# Patient Record
Sex: MALE | Race: WHITE | Employment: OTHER | URBAN - METROPOLITAN AREA
[De-identification: names, ages, dates, MRNs, and addresses within clinical notes are randomized per-mention and may not be internally consistent; named-entity substitution may affect disease eponyms.]

---

## 2018-11-06 ENCOUNTER — HOSPITAL ENCOUNTER (INPATIENT)
Age: 73
LOS: 2 days | Discharge: HOME | DRG: 309 | End: 2018-11-08
Attending: EMERGENCY MEDICINE | Admitting: INTERNAL MEDICINE
Payer: MEDICARE

## 2018-11-06 ENCOUNTER — APPOINTMENT (OUTPATIENT)
Dept: GENERAL RADIOLOGY | Age: 73
DRG: 309 | End: 2018-11-06
Attending: EMERGENCY MEDICINE
Payer: MEDICARE

## 2018-11-06 DIAGNOSIS — I48.91 ATRIAL FIBRILLATION WITH RAPID VENTRICULAR RESPONSE (HCC): Primary | ICD-10-CM

## 2018-11-06 DIAGNOSIS — E87.1 HYPONATREMIA: ICD-10-CM

## 2018-11-06 DIAGNOSIS — E87.6 HYPOKALEMIA: ICD-10-CM

## 2018-11-06 LAB
ALBUMIN SERPL-MCNC: 3.6 G/DL (ref 3.5–5)
ALBUMIN/GLOB SERPL: 0.8 {RATIO} (ref 1.1–2.2)
ALP SERPL-CCNC: 84 U/L (ref 45–117)
ALT SERPL-CCNC: 32 U/L (ref 12–78)
ANION GAP SERPL CALC-SCNC: 10 MMOL/L (ref 5–15)
ANION GAP SERPL CALC-SCNC: 7 MMOL/L (ref 5–15)
AST SERPL-CCNC: 17 U/L (ref 15–37)
ATRIAL RATE: 178 BPM
BASOPHILS # BLD: 0.1 K/UL (ref 0–0.1)
BASOPHILS NFR BLD: 0 % (ref 0–1)
BILIRUB SERPL-MCNC: 0.9 MG/DL (ref 0.2–1)
BUN SERPL-MCNC: 16 MG/DL (ref 6–20)
BUN SERPL-MCNC: 18 MG/DL (ref 6–20)
BUN/CREAT SERPL: 14 (ref 12–20)
BUN/CREAT SERPL: 14 (ref 12–20)
CALCIUM SERPL-MCNC: 8.4 MG/DL (ref 8.5–10.1)
CALCIUM SERPL-MCNC: 9.3 MG/DL (ref 8.5–10.1)
CALCULATED R AXIS, ECG10: 48 DEGREES
CALCULATED T AXIS, ECG11: 21 DEGREES
CHLORIDE SERPL-SCNC: 82 MMOL/L (ref 97–108)
CHLORIDE SERPL-SCNC: 91 MMOL/L (ref 97–108)
CK SERPL-CCNC: 72 U/L (ref 39–308)
CO2 SERPL-SCNC: 31 MMOL/L (ref 21–32)
CO2 SERPL-SCNC: 33 MMOL/L (ref 21–32)
COMMENT, HOLDF: NORMAL
CREAT SERPL-MCNC: 1.17 MG/DL (ref 0.7–1.3)
CREAT SERPL-MCNC: 1.31 MG/DL (ref 0.7–1.3)
DIAGNOSIS, 93000: NORMAL
DIFFERENTIAL METHOD BLD: ABNORMAL
EOSINOPHIL # BLD: 0.2 K/UL (ref 0–0.4)
EOSINOPHIL NFR BLD: 2 % (ref 0–7)
ERYTHROCYTE [DISTWIDTH] IN BLOOD BY AUTOMATED COUNT: 12.9 % (ref 11.5–14.5)
GLOBULIN SER CALC-MCNC: 4.4 G/DL (ref 2–4)
GLUCOSE BLD STRIP.AUTO-MCNC: 172 MG/DL (ref 65–100)
GLUCOSE BLD STRIP.AUTO-MCNC: 260 MG/DL (ref 65–100)
GLUCOSE SERPL-MCNC: 145 MG/DL (ref 65–100)
GLUCOSE SERPL-MCNC: 208 MG/DL (ref 65–100)
HCT VFR BLD AUTO: 44.9 % (ref 36.6–50.3)
HGB BLD-MCNC: 15.9 G/DL (ref 12.1–17)
IMM GRANULOCYTES # BLD: 0 K/UL (ref 0–0.04)
IMM GRANULOCYTES NFR BLD AUTO: 0 % (ref 0–0.5)
LYMPHOCYTES # BLD: 1.2 K/UL (ref 0.8–3.5)
LYMPHOCYTES NFR BLD: 11 % (ref 12–49)
MAGNESIUM SERPL-MCNC: 2 MG/DL (ref 1.6–2.4)
MCH RBC QN AUTO: 27.9 PG (ref 26–34)
MCHC RBC AUTO-ENTMCNC: 35.4 G/DL (ref 30–36.5)
MCV RBC AUTO: 78.8 FL (ref 80–99)
MONOCYTES # BLD: 1.2 K/UL (ref 0–1)
MONOCYTES NFR BLD: 11 % (ref 5–13)
NEUTS SEG # BLD: 8.5 K/UL (ref 1.8–8)
NEUTS SEG NFR BLD: 76 % (ref 32–75)
NRBC # BLD: 0 K/UL (ref 0–0.01)
NRBC BLD-RTO: 0 PER 100 WBC
PHOSPHATE SERPL-MCNC: 3 MG/DL (ref 2.6–4.7)
PLATELET # BLD AUTO: 416 K/UL (ref 150–400)
PMV BLD AUTO: 9.4 FL (ref 8.9–12.9)
POTASSIUM SERPL-SCNC: 2.8 MMOL/L (ref 3.5–5.1)
POTASSIUM SERPL-SCNC: 3 MMOL/L (ref 3.5–5.1)
PROT SERPL-MCNC: 8 G/DL (ref 6.4–8.2)
Q-T INTERVAL, ECG07: 296 MS
QRS DURATION, ECG06: 90 MS
QTC CALCULATION (BEZET), ECG08: 478 MS
RBC # BLD AUTO: 5.7 M/UL (ref 4.1–5.7)
SAMPLES BEING HELD,HOLD: NORMAL
SERVICE CMNT-IMP: ABNORMAL
SERVICE CMNT-IMP: ABNORMAL
SODIUM SERPL-SCNC: 125 MMOL/L (ref 136–145)
SODIUM SERPL-SCNC: 129 MMOL/L (ref 136–145)
TROPONIN I SERPL-MCNC: <0.05 NG/ML
VENTRICULAR RATE, ECG03: 157 BPM
WBC # BLD AUTO: 11.2 K/UL (ref 4.1–11.1)

## 2018-11-06 PROCEDURE — 85025 COMPLETE CBC W/AUTO DIFF WBC: CPT | Performed by: EMERGENCY MEDICINE

## 2018-11-06 PROCEDURE — 74011250636 HC RX REV CODE- 250/636: Performed by: INTERNAL MEDICINE

## 2018-11-06 PROCEDURE — 74011000258 HC RX REV CODE- 258: Performed by: EMERGENCY MEDICINE

## 2018-11-06 PROCEDURE — 83735 ASSAY OF MAGNESIUM: CPT | Performed by: INTERNAL MEDICINE

## 2018-11-06 PROCEDURE — 74011250637 HC RX REV CODE- 250/637: Performed by: INTERNAL MEDICINE

## 2018-11-06 PROCEDURE — 36415 COLL VENOUS BLD VENIPUNCTURE: CPT | Performed by: INTERNAL MEDICINE

## 2018-11-06 PROCEDURE — 71045 X-RAY EXAM CHEST 1 VIEW: CPT

## 2018-11-06 PROCEDURE — 93005 ELECTROCARDIOGRAM TRACING: CPT

## 2018-11-06 PROCEDURE — 74011636637 HC RX REV CODE- 636/637: Performed by: INTERNAL MEDICINE

## 2018-11-06 PROCEDURE — 80053 COMPREHEN METABOLIC PANEL: CPT | Performed by: EMERGENCY MEDICINE

## 2018-11-06 PROCEDURE — 74011000250 HC RX REV CODE- 250: Performed by: EMERGENCY MEDICINE

## 2018-11-06 PROCEDURE — 99285 EMERGENCY DEPT VISIT HI MDM: CPT

## 2018-11-06 PROCEDURE — 82962 GLUCOSE BLOOD TEST: CPT

## 2018-11-06 PROCEDURE — 96374 THER/PROPH/DIAG INJ IV PUSH: CPT

## 2018-11-06 PROCEDURE — 82550 ASSAY OF CK (CPK): CPT | Performed by: EMERGENCY MEDICINE

## 2018-11-06 PROCEDURE — 74011250636 HC RX REV CODE- 250/636: Performed by: EMERGENCY MEDICINE

## 2018-11-06 PROCEDURE — 84100 ASSAY OF PHOSPHORUS: CPT | Performed by: INTERNAL MEDICINE

## 2018-11-06 PROCEDURE — 96361 HYDRATE IV INFUSION ADD-ON: CPT

## 2018-11-06 PROCEDURE — 94761 N-INVAS EAR/PLS OXIMETRY MLT: CPT

## 2018-11-06 PROCEDURE — 84484 ASSAY OF TROPONIN QUANT: CPT | Performed by: EMERGENCY MEDICINE

## 2018-11-06 PROCEDURE — 74011250637 HC RX REV CODE- 250/637: Performed by: EMERGENCY MEDICINE

## 2018-11-06 PROCEDURE — 65660000000 HC RM CCU STEPDOWN

## 2018-11-06 PROCEDURE — 96376 TX/PRO/DX INJ SAME DRUG ADON: CPT

## 2018-11-06 PROCEDURE — 74011000250 HC RX REV CODE- 250

## 2018-11-06 RX ORDER — INDAPAMIDE 2.5 MG/1
2.5 TABLET, FILM COATED ORAL DAILY
COMMUNITY
End: 2018-11-08

## 2018-11-06 RX ORDER — MAGNESIUM SULFATE 100 %
4 CRYSTALS MISCELLANEOUS AS NEEDED
Status: DISCONTINUED | OUTPATIENT
Start: 2018-11-06 | End: 2018-11-08 | Stop reason: HOSPADM

## 2018-11-06 RX ORDER — NORTRIPTYLINE HYDROCHLORIDE 10 MG/1
20 CAPSULE ORAL
Status: DISCONTINUED | OUTPATIENT
Start: 2018-11-06 | End: 2018-11-08 | Stop reason: HOSPADM

## 2018-11-06 RX ORDER — DEXTROSE 50 % IN WATER (D50W) INTRAVENOUS SYRINGE
12.5-25 AS NEEDED
Status: DISCONTINUED | OUTPATIENT
Start: 2018-11-06 | End: 2018-11-08 | Stop reason: HOSPADM

## 2018-11-06 RX ORDER — POTASSIUM CHLORIDE 20 MEQ/1
20 TABLET, EXTENDED RELEASE ORAL ONCE
Status: COMPLETED | OUTPATIENT
Start: 2018-11-06 | End: 2018-11-06

## 2018-11-06 RX ORDER — DILTIAZEM HYDROCHLORIDE 5 MG/ML
0.25 INJECTION INTRAVENOUS ONCE
Status: COMPLETED | OUTPATIENT
Start: 2018-11-06 | End: 2018-11-06

## 2018-11-06 RX ORDER — IBUPROFEN 800 MG/1
800 TABLET ORAL
COMMUNITY

## 2018-11-06 RX ORDER — MELATONIN
1000 DAILY
COMMUNITY

## 2018-11-06 RX ORDER — GUAIFENESIN 100 MG/5ML
81 LIQUID (ML) ORAL DAILY
Status: DISCONTINUED | OUTPATIENT
Start: 2018-11-07 | End: 2018-11-08 | Stop reason: HOSPADM

## 2018-11-06 RX ORDER — ADENOSINE 3 MG/ML
6 INJECTION, SOLUTION INTRAVENOUS
Status: COMPLETED | OUTPATIENT
Start: 2018-11-06 | End: 2018-11-06

## 2018-11-06 RX ORDER — INSULIN LISPRO 100 [IU]/ML
INJECTION, SOLUTION INTRAVENOUS; SUBCUTANEOUS
Status: DISCONTINUED | OUTPATIENT
Start: 2018-11-06 | End: 2018-11-06 | Stop reason: CLARIF

## 2018-11-06 RX ORDER — BISACODYL 5 MG
5 TABLET, DELAYED RELEASE (ENTERIC COATED) ORAL DAILY PRN
Status: DISCONTINUED | OUTPATIENT
Start: 2018-11-06 | End: 2018-11-08 | Stop reason: HOSPADM

## 2018-11-06 RX ORDER — DILTIAZEM HYDROCHLORIDE 5 MG/ML
INJECTION INTRAVENOUS
Status: COMPLETED
Start: 2018-11-06 | End: 2018-11-06

## 2018-11-06 RX ORDER — MAGNESIUM SULFATE 100 %
4 CRYSTALS MISCELLANEOUS AS NEEDED
Status: DISCONTINUED | OUTPATIENT
Start: 2018-11-06 | End: 2018-11-06 | Stop reason: CLARIF

## 2018-11-06 RX ORDER — ENOXAPARIN SODIUM 100 MG/ML
1 INJECTION SUBCUTANEOUS EVERY 12 HOURS
Status: DISCONTINUED | OUTPATIENT
Start: 2018-11-06 | End: 2018-11-06

## 2018-11-06 RX ORDER — MELATONIN
1000 DAILY
Status: DISCONTINUED | OUTPATIENT
Start: 2018-11-07 | End: 2018-11-08 | Stop reason: HOSPADM

## 2018-11-06 RX ORDER — POTASSIUM CHLORIDE 20 MEQ/1
40 TABLET, EXTENDED RELEASE ORAL
Status: COMPLETED | OUTPATIENT
Start: 2018-11-06 | End: 2018-11-06

## 2018-11-06 RX ORDER — SODIUM CHLORIDE AND POTASSIUM CHLORIDE .9; .15 G/100ML; G/100ML
SOLUTION INTRAVENOUS CONTINUOUS
Status: DISCONTINUED | OUTPATIENT
Start: 2018-11-06 | End: 2018-11-08 | Stop reason: HOSPADM

## 2018-11-06 RX ORDER — INSULIN LISPRO 100 [IU]/ML
INJECTION, SOLUTION INTRAVENOUS; SUBCUTANEOUS
Status: DISCONTINUED | OUTPATIENT
Start: 2018-11-06 | End: 2018-11-08 | Stop reason: HOSPADM

## 2018-11-06 RX ORDER — ADENOSINE 3 MG/ML
12 INJECTION, SOLUTION INTRAVENOUS
Status: COMPLETED | OUTPATIENT
Start: 2018-11-06 | End: 2018-11-06

## 2018-11-06 RX ORDER — SODIUM CHLORIDE 0.9 % (FLUSH) 0.9 %
5-10 SYRINGE (ML) INJECTION AS NEEDED
Status: DISCONTINUED | OUTPATIENT
Start: 2018-11-06 | End: 2018-11-08 | Stop reason: HOSPADM

## 2018-11-06 RX ORDER — ONDANSETRON 2 MG/ML
4 INJECTION INTRAMUSCULAR; INTRAVENOUS
Status: DISCONTINUED | OUTPATIENT
Start: 2018-11-06 | End: 2018-11-08 | Stop reason: HOSPADM

## 2018-11-06 RX ORDER — SODIUM CHLORIDE 0.9 % (FLUSH) 0.9 %
5-10 SYRINGE (ML) INJECTION EVERY 8 HOURS
Status: DISCONTINUED | OUTPATIENT
Start: 2018-11-06 | End: 2018-11-08 | Stop reason: HOSPADM

## 2018-11-06 RX ORDER — ACETAMINOPHEN 325 MG/1
650 TABLET ORAL
Status: DISCONTINUED | OUTPATIENT
Start: 2018-11-06 | End: 2018-11-08 | Stop reason: HOSPADM

## 2018-11-06 RX ORDER — ATORVASTATIN CALCIUM 10 MG/1
10 TABLET, FILM COATED ORAL DAILY
Status: DISCONTINUED | OUTPATIENT
Start: 2018-11-07 | End: 2018-11-08 | Stop reason: HOSPADM

## 2018-11-06 RX ORDER — DEXTROSE 50 % IN WATER (D50W) INTRAVENOUS SYRINGE
25-50 AS NEEDED
Status: DISCONTINUED | OUTPATIENT
Start: 2018-11-06 | End: 2018-11-06 | Stop reason: CLARIF

## 2018-11-06 RX ORDER — POTASSIUM CHLORIDE 750 MG/1
60 TABLET, FILM COATED, EXTENDED RELEASE ORAL
Status: DISCONTINUED | OUTPATIENT
Start: 2018-11-06 | End: 2018-11-06 | Stop reason: CLARIF

## 2018-11-06 RX ADMIN — INSULIN LISPRO 5 UNITS: 100 INJECTION, SOLUTION INTRAVENOUS; SUBCUTANEOUS at 21:02

## 2018-11-06 RX ADMIN — POTASSIUM CHLORIDE 20 MEQ: 20 TABLET, EXTENDED RELEASE ORAL at 19:37

## 2018-11-06 RX ADMIN — ENOXAPARIN SODIUM 90 MG: 100 INJECTION, SOLUTION INTRAVENOUS; SUBCUTANEOUS at 19:35

## 2018-11-06 RX ADMIN — ADENOSINE 6 MG: 3 INJECTION, SOLUTION INTRAVENOUS at 13:51

## 2018-11-06 RX ADMIN — Medication 10 ML: at 18:05

## 2018-11-06 RX ADMIN — ADENOSINE 12 MG: 3 INJECTION, SOLUTION INTRAVENOUS at 13:52

## 2018-11-06 RX ADMIN — SODIUM CHLORIDE 1000 ML: 900 INJECTION, SOLUTION INTRAVENOUS at 13:50

## 2018-11-06 RX ADMIN — DILTIAZEM HYDROCHLORIDE 23.5 MG: 5 INJECTION INTRAVENOUS at 14:25

## 2018-11-06 RX ADMIN — POTASSIUM CHLORIDE 40 MEQ: 20 TABLET, EXTENDED RELEASE ORAL at 15:34

## 2018-11-06 RX ADMIN — DILTIAZEM HYDROCHLORIDE 5 MG/HR: 5 INJECTION, SOLUTION INTRAVENOUS at 15:05

## 2018-11-06 RX ADMIN — SODIUM CHLORIDE AND POTASSIUM CHLORIDE: 9; 1.49 INJECTION, SOLUTION INTRAVENOUS at 19:37

## 2018-11-06 RX ADMIN — NORTRIPTYLINE HYDROCHLORIDE 20 MG: 10 CAPSULE ORAL at 21:04

## 2018-11-06 RX ADMIN — Medication 10 ML: at 21:02

## 2018-11-06 RX ADMIN — DILTIAZEM HYDROCHLORIDE 7.5 MG/HR: 5 INJECTION, SOLUTION INTRAVENOUS at 23:11

## 2018-11-06 NOTE — ED PROVIDER NOTES
EMERGENCY DEPARTMENT HISTORY AND PHYSICAL EXAM 
 
 
Date: 11/6/2018 Patient Name: Booker West History of Presenting Illness Chief Complaint Patient presents with  Chest Pain Chest tightness since last night with associated SOB. Patient in A. FIb without further hx.  Irregular Heart Beat History Provided By: Patient HPI: Booker West, 72 y.o. male with PMHx significant for DM, HTN, asthma, presents via EMS to the ED with cc of intermittent, moderate SOB and chest tightness x 3 days. Pt states his sx's are exacerbated by exertion and improved with rest. He was seen and evaluated by PCP this morning where his heart rate was found to be fast, prompting his ED visit. Upon EMS arrival pt found to be in afib with RVR. He denies hx of similar sx's. He specifically denies any fevers, chills, nausea, vomiting, headache, rash, diarrhea, sweating or weight loss. There are no other complaints, changes, or physical findings at this time. PCP: Maricel Juan MD 
 
Current Facility-Administered Medications Medication Dose Route Frequency Provider Last Rate Last Dose  dilTIAZem (CARDIZEM) 125 mg in dextrose 5% 125 mL infusion  5-15 mg/hr IntraVENous TITRATE Ronald Moyer MD 5 mL/hr at 11/06/18 1505 5 mg/hr at 11/06/18 1505  potassium chloride (K-DUR, KLOR-CON) SR tablet 40 mEq  40 mEq Oral NOW Ronald Moyer MD      
 potassium chloride (K-DUR, KLOR-CON) SR tablet 20 mEq  20 mEq Oral ONCE Ronald Moyer MD      
 
Current Outpatient Medications Medication Sig Dispense Refill  indapamide (LOZOL) 2.5 mg tablet Take 2.5 mg by mouth daily.  ibuprofen (MOTRIN) 800 mg tablet Take 800 mg by mouth every eight (8) hours as needed for Pain.  cholecalciferol (VITAMIN D3) 1,000 unit tablet Take 1,000 Units by mouth daily.  vit C/E/Zn/coppr/lutein/zeaxan (PRESERVISION AREDS-2 PO) Take 1 Tab by mouth daily.  nortriptyline (PAMELOR) 10 mg capsule Take 20 mg by mouth nightly.  rosuvastatin (CRESTOR) 5 mg tablet Take 5 mg by mouth daily.  telmisartan (MICARDIS) 40 mg tablet Take 40 mg by mouth nightly.  aspirin 81 mg chewable tablet Take 81 mg by mouth daily. Past History Past Medical History: 
Past Medical History:  
Diagnosis Date  Asthma   
 as a child  Diabetes ([address] diet controlled  Hypertension Past Surgical History: 
Past Surgical History:  
Procedure Laterality Date  HX GI    
 polypectomy  HX HEENT    
 sinus surgery  HX ORTHOPAEDIC  2009  
 lt shoulder replacement Family History: 
History reviewed. No pertinent family history. Social History: 
Social History Tobacco Use  Smoking status: Never Smoker Substance Use Topics  Alcohol use: No  
 Drug use: Not on file Allergies: 
No Known Allergies Review of Systems Review of Systems Constitutional: Negative. Negative for activity change, appetite change, chills, fatigue, fever and unexpected weight change. HENT: Negative. Negative for congestion, hearing loss, rhinorrhea, sneezing and voice change. Eyes: Negative. Negative for pain and visual disturbance. Respiratory: Positive for chest tightness and shortness of breath. Negative for apnea, cough and choking. Cardiovascular: Negative. Negative for palpitations. Gastrointestinal: Negative. Negative for abdominal distention, abdominal pain, blood in stool, diarrhea, nausea and vomiting. Genitourinary: Negative. Negative for difficulty urinating, flank pain, frequency and urgency. No discharge Musculoskeletal: Negative. Negative for arthralgias, back pain, myalgias and neck stiffness. Skin: Negative. Negative for color change and rash. Neurological: Negative. Negative for dizziness, seizures, syncope, speech difficulty, weakness, numbness and headaches. Hematological: Negative for adenopathy. Psychiatric/Behavioral: Negative. Negative for agitation, behavioral problems, dysphoric mood and suicidal ideas. The patient is not nervous/anxious. Physical Exam  
Physical Exam  
Constitutional: He is oriented to person, place, and time. He appears well-developed and well-nourished. No distress. HENT:  
Head: Normocephalic and atraumatic. Mouth/Throat: Oropharynx is clear and moist. No oropharyngeal exudate. Eyes: Conjunctivae and EOM are normal. Pupils are equal, round, and reactive to light. Right eye exhibits no discharge. Left eye exhibits no discharge. Neck: Normal range of motion. Neck supple. Cardiovascular: Intact distal pulses. An irregularly irregular rhythm present. Tachycardia present. Exam reveals no gallop and no friction rub. No murmur heard. Pulmonary/Chest: Effort normal and breath sounds normal. No respiratory distress. He has no wheezes. He has no rales. He exhibits no tenderness. Abdominal: Soft. Bowel sounds are normal. He exhibits no distension and no mass. There is no tenderness. There is no rebound and no guarding. Musculoskeletal: Normal range of motion. He exhibits no edema. Lymphadenopathy:  
  He has no cervical adenopathy. Neurological: He is alert and oriented to person, place, and time. No cranial nerve deficit. Coordination normal.  
Skin: Skin is warm and dry. No rash noted. No erythema. Psychiatric: He has a normal mood and affect. Nursing note and vitals reviewed. Diagnostic Study Results Labs - Recent Results (from the past 12 hour(s)) EKG, 12 LEAD, INITIAL Collection Time: 11/06/18  1:32 PM  
Result Value Ref Range Ventricular Rate 157 BPM  
 Atrial Rate 178 BPM  
 QRS Duration 90 ms Q-T Interval 296 ms QTC Calculation (Bezet) 478 ms Calculated R Axis 48 degrees Calculated T Axis 21 degrees Diagnosis Atrial fibrillation with rapid ventricular response Nonspecific ST abnormality When compared with ECG of 07-APR-2009 08:45, Atrial fibrillation has replaced Sinus rhythm Vent. rate has increased BY  98 BPM 
ST now depressed in Inferior leads ST now depressed in Anterolateral leads Confirmed by Marquise Aponte ([id number]) on 11/6/2018 3:10:58 PM 
  
CBC WITH AUTOMATED DIFF Collection Time: 11/06/18  1:38 PM  
Result Value Ref Range WBC 11.2 (H) 4.1 - 11.1 K/uL  
 RBC 5.70 4. 10 - 5.70 M/uL  
 HGB 15.9 12.1 - 17.0 g/dL HCT 44.9 36.6 - 50.3 % MCV 78.8 (L) 80.0 - 99.0 FL  
 MCH 27.9 26.0 - 34.0 PG  
 MCHC 35.4 30.0 - 36.5 g/dL  
 RDW 12.9 11.5 - 14.5 % PLATELET 910 (H) 366 - 400 K/uL MPV 9.4 8.9 - 12.9 FL  
 NRBC 0.0 0  WBC ABSOLUTE NRBC 0.00 0.00 - 0.01 K/uL NEUTROPHILS 76 (H) 32 - 75 % LYMPHOCYTES 11 (L) 12 - 49 % MONOCYTES 11 5 - 13 % EOSINOPHILS 2 0 - 7 % BASOPHILS 0 0 - 1 % IMMATURE GRANULOCYTES 0 0.0 - 0.5 % ABS. NEUTROPHILS 8.5 (H) 1.8 - 8.0 K/UL  
 ABS. LYMPHOCYTES 1.2 0.8 - 3.5 K/UL  
 ABS. MONOCYTES 1.2 (H) 0.0 - 1.0 K/UL  
 ABS. EOSINOPHILS 0.2 0.0 - 0.4 K/UL  
 ABS. BASOPHILS 0.1 0.0 - 0.1 K/UL  
 ABS. IMM. GRANS. 0.0 0.00 - 0.04 K/UL  
 DF AUTOMATED METABOLIC PANEL, COMPREHENSIVE Collection Time: 11/06/18  1:38 PM  
Result Value Ref Range Sodium 125 (L) 136 - 145 mmol/L Potassium 2.8 (L) 3.5 - 5.1 mmol/L Chloride 82 (L) 97 - 108 mmol/L  
 CO2 33 (H) 21 - 32 mmol/L Anion gap 10 5 - 15 mmol/L Glucose 208 (H) 65 - 100 mg/dL BUN 18 6 - 20 MG/DL Creatinine 1.31 (H) 0.70 - 1.30 MG/DL  
 BUN/Creatinine ratio 14 12 - 20 GFR est AA >60 >60 ml/min/1.73m2 GFR est non-AA 54 (L) >60 ml/min/1.73m2 Calcium 9.3 8.5 - 10.1 MG/DL Bilirubin, total 0.9 0.2 - 1.0 MG/DL  
 ALT (SGPT) 32 12 - 78 U/L  
 AST (SGOT) 17 15 - 37 U/L Alk. phosphatase 84 45 - 117 U/L Protein, total 8.0 6.4 - 8.2 g/dL Albumin 3.6 3.5 - 5.0 g/dL Globulin 4.4 (H) 2.0 - 4.0 g/dL A-G Ratio 0.8 (L) 1.1 - 2.2 CK W/ REFLX CKMB Collection Time: 11/06/18  1:38 PM  
Result Value Ref Range CK 72 39 - 308 U/L  
TROPONIN I Collection Time: 11/06/18  1:38 PM  
Result Value Ref Range Troponin-I, Qt. <0.05 <0.05 ng/mL SAMPLES BEING HELD Collection Time: 11/06/18  1:38 PM  
Result Value Ref Range SAMPLES BEING HELD 1BLUE   
 COMMENT Add-on orders for these samples will be processed based on acceptable specimen integrity and analyte stability, which may vary by analyte. Radiologic Studies - CXR Results  (Last 48 hours) 11/06/18 1450  XR CHEST PORT Final result Impression:  IMPRESSION: No acute findings. Narrative:  EXAM:  XR CHEST PORT INDICATION:  chest pain COMPARISON:  1/28/2014 FINDINGS: A portable AP radiograph of the chest was obtained at 1432 hours. There is no pneumothorax or pleural effusion. The lungs are clear. Cardiac,  
mediastinal and hilar contours appear within normal limits. Left glenohumeral  
total arthroplasty is again shown. Medical Decision Making I am the first provider for this patient. I reviewed the vital signs, available nursing notes, past medical history, past surgical history, family history and social history. Vital Signs-Reviewed the patient's vital signs. Patient Vitals for the past 12 hrs: 
 Temp Pulse Resp BP SpO2  
11/06/18 1500  (!) 111 24 127/78 92 % 11/06/18 1428  (!) 103 22 135/65 96 % 11/06/18 1403  (!) 148 24 (!) 154/102 98 % 11/06/18 1352  (!) 149  (!) 139/113   
11/06/18 1351  (!) 151  (!) 139/113   
11/06/18 1330 98.1 °F (36.7 °C) (!) 154 22 (!) 139/113 95 % Pulse Oximetry Analysis - 95% on RA Cardiac Monitor:  
Rate: 154 bpm 
Rhythm: Atrial Fibrillation with RVR EKG interpretation: (Preliminary) [address] Rhythm: afib with RVR; and irregular.  Rate (approx.): 157; Axis: normal; QRS interval: normal ; ST/T wave: normal. 
Written by Mabel Davila ED Scribe, as dictated by Asa Muse. Jensen Rueda MD 
 
Records Reviewed: Nursing Notes, Old Medical Records, Previous electrocardiograms, Ambulance Run Sheet, Previous Radiology Studies and Previous Laboratory Studies Provider Notes (Medical Decision Making): DDx: ACS, arrhythmia, electrolyte abnormality, dehydration ED Course:  
Initial assessment performed. The patients presenting problems have been discussed, and they are in agreement with the care plan formulated and outlined with them. I have encouraged them to ask questions as they arise throughout their visit. Procedure Note - Chemical Cardioversion:  
1:52 PM 
Performed by Asa Muse. Jensen Rueda MD . Cardioversion was indicated for a rhythm of atrial fibrillation with RVR and performed 2 times. 6 mg of adenosine were given to pt, but pt quickly returned to afib. Pt was then given an additional 12mg of Adenosine. Patient's rhythm was atrial fibrillation with RVR at the end of the procedure. Will hang diltiazem bolus and admit to hospitalist. 
The procedure took 1-15 minutes, and pt tolerated well. CONSULT NOTE:  
3:18 PM 
Huber Rueda MD spoke with Harriet Galindo MD, Specialty: Hospitalist 
Discussed pt's hx, disposition, and available diagnostic and imaging results. Reviewed care plans. Consultant will evaluate pt for admission. Written by Frankie Thompson ED Scribe, as dictated by Asa Muse. Jensen Rueda MD. 
 
CRITICAL CARE NOTE : 
 
1:58 PM 
 
IMPENDING DETERIORATION -Cardiovascular ASSOCIATED RISK FACTORS - Metabolic changes MANAGEMENT- Bedside Assessment and Supervision of Care INTERPRETATION -  ECG, Blood Pressure and Cardiac Output Measures INTERVENTIONS - hemodynamic mngmt CASE REVIEW - Hospitalist 
TREATMENT RESPONSE -Improved and Stable PERFORMED BY - Self NOTES   : 
 
I have spent 45 minutes of critical care time involved in lab review, consultations with specialist, family decision- making, bedside attention and documentation. During this entire length of time I was immediately available to the patient . Huber Barry MD 
 
Disposition: 
ADMIT NOTE: 
3:20 PM 
The patient is being admitted to the hospital by Arturo Parmar MD.  The results of their tests and reasons for their admission have been discussed with the patient and/or available family. They convey agreement and understanding for the need to be admitted and for their admission diagnosis. PLAN: 
1. Admit to hosptialist 
 
Diagnosis Clinical Impression: 1. Atrial fibrillation with rapid ventricular response ([address] 2. Hypokalemia 3. Hyponatremia Attestations: This note is prepared by Amadna Foley, acting as Scribe for Tamika Werner. [address] Carmen Barry MD: The scribe's documentation has been prepared under my direction and personally reviewed by me in its entirety. I confirm that the note above accurately reflects all work, treatment, procedures, and medical decision making performed by me.

## 2018-11-06 NOTE — ED NOTES
TRANSFER - OUT REPORT: 
 
Verbal report given to IRMA Lowe(name) on Jordan Freed  being transferred to PCU (unit) for routine progression of care Report consisted of patients Situation, Background, Assessment and  
Recommendations(SBAR). Information from the following report(s) SBAR, Kardex, ED Summary, Intake/Output, MAR, Recent Results, Med Rec Status and Cardiac Rhythm A. Fib was reviewed with the receiving nurse. Lines:  
Peripheral IV 11/06/18 Right Antecubital (Active) Site Assessment Clean, dry, & intact 11/6/2018  1:42 PM  
Phlebitis Assessment 0 11/6/2018  1:42 PM  
Infiltration Assessment 0 11/6/2018  1:42 PM  
Dressing Status Clean, dry, & intact 11/6/2018  1:42 PM  
Dressing Type Transparent;Tape 11/6/2018  1:42 PM  
Hub Color/Line Status Pink;Flushed 11/6/2018  1:42 PM  
Action Taken Blood drawn 11/6/2018  1:42 PM  
   
Peripheral IV 11/06/18 Left Antecubital (Active) Site Assessment Clean, dry, & intact 11/6/2018  2:25 PM  
Phlebitis Assessment 0 11/6/2018  2:25 PM  
Infiltration Assessment 0 11/6/2018  2:25 PM  
Dressing Status Clean, dry, & intact 11/6/2018  2:25 PM  
Dressing Type Transparent;Tape 11/6/2018  2:25 PM  
Hub Color/Line Status Pink;Flushed 11/6/2018  2:25 PM  
Action Taken Blood drawn 11/6/2018  2:25 PM  
  
 
Opportunity for questions and clarification was provided. Patient transported with: 
 Monitor Registered Nurse

## 2018-11-06 NOTE — PROGRESS NOTES
Pharmacy Clarification of Prior to Admission Medication Regimen The patient was interviewed regarding clarification of the prior to admission medication regimen. Patient's wife was present in room and obtained permission from patient to discuss drug regimen with visitor(s) present. Patient was questioned regarding use of any other inhalers, topical products, over the counter medications, herbal medications, vitamin products or ophthalmic/nasal/otic medication use. Information Obtained From: Patient, Lizzie Lopez Pertinent Pharmacy Findings: 
? Updated patients [facility] [address] [place] [address] [address] AT [facility]  
 
[facility] medication list was corrected to the following:  
 
Prior to Admission Medications Prescriptions Last Dose Informant Patient Reported? Taking?  
aspirin 81 mg chewable tablet 11/1/2018 at Unknown time Self Yes Yes Sig: Take 81 mg by mouth daily. cholecalciferol (VITAMIN D3) 1,000 unit tablet 11/6/2018 at Unknown time Self Yes Yes Sig: Take 1,000 Units by mouth daily. ibuprofen (MOTRIN) 800 mg tablet 11/6/2018 at Unknown time Self Yes Yes Sig: Take 800 mg by mouth every eight (8) hours as needed for Pain. indapamide (LOZOL) 2.5 mg tablet 11/6/2018 at Unknown time Self Yes Yes Sig: Take 2.5 mg by mouth daily. nortriptyline (PAMELOR) 10 mg capsule 11/5/2018 at Unknown time Self Yes Yes Sig: Take 20 mg by mouth nightly. rosuvastatin (CRESTOR) 5 mg tablet 11/6/2018 at Unknown time Self Yes Yes Sig: Take 5 mg by mouth daily. telmisartan (MICARDIS) 40 mg tablet 11/5/2018 at Unknown time Self Yes Yes Sig: Take 40 mg by mouth nightly. vit C/E/Zn/coppr/lutein/zeaxan (PRESERVISION AREDS-2 PO) 11/6/2018 at Unknown time Self Yes Yes Sig: Take 1 Tab by mouth daily. Facility-Administered Medications: None Thank you, 
Bonita Amanda PharmD candidate Class of 2019

## 2018-11-06 NOTE — H&P
Hospitalist Admission NoteNAME: Britney Banks :  1945 MRN:  [id number] Date/Time:  2018 3:21 PM 
 
Patient PCP: Dulce Alcantara MD 
______________________________________________________________________ Given the patient's current clinical presentation, I have a high level of concern for decompensation if discharged from the emergency department. Complex decision making was performed, which includes reviewing the patient's available past medical records, laboratory results, and x-ray films. My assessment of this patient's clinical condition and my plan of care is as follows. Assessment / Plan: 
New-onset atrial fibrillation with RVR in setting of severe hypokalemia presumed due to recent gastroenteritis: denies palpitations - CXR with no acute findings 
- aggressively replacing potassium - check K, mag, phos later this evening 
- diltiazem gtt 
- emperic therapeutic lovenox for now - check TSH in AM 
- check echo - has never had stress or echo before, has never seen cardiology 
- cardiology consult in AM 
AODM with hyperglycemia: admits to being less compliant with diet recently - check HgA1c in AM 
- lispro sliding scale Hypertension, benign/essential: controlled 
- holding lozol and telmistartan to make room for diltiazem gtt 
- diltiazem as above Hyperlipidemia: con't statin JEISON: says he lost weight and told he no longer needs CPAP but wife endorses snoring 
- consider referral for repeat outpatient sleep testing Code Status: Full Surrogate Decision Maker: wife Jahaira Gallegos cell [phone] - wife's mother with dementia and colostomy lives with Pt so she is unable to come to the hospital very often; Please call his wife with updates DVT Prophylaxis: lovenox Baseline: independent Subjective: CHIEF COMPLAINT:  Sent from PCP for tachycardia HISTORY OF PRESENT ILLNESS:    
Danya Michelle is a 72 y.o.  male who presents with above.   Pt says he has been feeling poorly since last week. He complains of a \"stomach flu\" in which he had severe abdominal pain for a few days. He denies diarrhea with this. He did have loss of appetite and was not eating well, but was drinking soup and water. He recovered from this and was feeling better until a couple of days ago. Then, he developed chest pain throughout his precordium radiating into his neck. No radiation to his arms. He has PIZANO which is unusual for him. He denies nausea or stomach pain currently. He does endorse lightheadedness and dizziness. No new edema or focal weakness. He finally became concerned enough to see his PCP today but was found to have tachycardia and sent to the ER. We were asked to admit for work up and evaluation of the above problems. Past Medical History:  
Diagnosis Date  Asthma   
 as a child  Diabetes ([address] diet controlled  Hyperlipidemia  Hypertension  JEISON (obstructive sleep apnea)   
 lost weight, no longer on CPAP Past Surgical History:  
Procedure Laterality Date  HX GI    
 polypectomy  HX HEENT    
 sinus surgery  HX ORTHOPAEDIC  2009  
 lt shoulder replacement Social History Tobacco Use  Smoking status: Never Smoker Substance Use Topics  Alcohol use: No  
  
 
Family History Problem Relation Age of Onset  Coronary Artery Disease Mother  Coronary Artery Disease Father  Coronary Artery Disease Sister  Diabetes Sister 2 sisters \"prediabetes\" No Known Allergies Prior to Admission medications Medication Sig Start Date End Date Taking? Authorizing Provider  
indapamide (LOZOL) 2.5 mg tablet Take 2.5 mg by mouth daily. Yes Jose, MD Luigi  
ibuprofen (MOTRIN) 800 mg tablet Take 800 mg by mouth every eight (8) hours as needed for Pain. Yes Jose, MD Luigi  
cholecalciferol (VITAMIN D3) 1,000 unit tablet Take 1,000 Units by mouth daily.    Yes Luigi Garcia MD  
 vit C/E/Zn/coppr/lutein/zeaxan (PRESERVISION AREDS-2 PO) Take 1 Tab by mouth daily. Yes Other, MD Luigi  
nortriptyline (PAMELOR) 10 mg capsule Take 20 mg by mouth nightly. Yes Provider, Historical  
rosuvastatin (CRESTOR) 5 mg tablet Take 5 mg by mouth daily. Yes Provider, Historical  
telmisartan (MICARDIS) 40 mg tablet Take 40 mg by mouth nightly. Yes Provider, Historical  
aspirin 81 mg chewable tablet Take 81 mg by mouth daily. Yes Provider, Historical  
 
 
REVIEW OF SYSTEMS:    
I am not able to complete the review of systems because: The patient is intubated and sedated The patient has altered mental status due to his acute medical problems The patient has baseline aphasia from prior stroke(s) The patient has baseline dementia and is not reliable historian The patient is in acute medical distress and unable to provide information Total of 12 systems reviewed as follows:   
   POSITIVE= underlined text  Negative = text not underlined General:  fever, chills, sweats, generalized weakness, weight loss/gain,  
   loss of appetite Eyes:    blurred vision, eye pain, loss of vision, double vision ENT:    rhinorrhea, pharyngitis Respiratory:   cough, sputum production, SOB, PIZANO, wheezing, pleuritic pain  
Cardiology:   chest pain, palpitations, orthopnea, PND, edema, syncope Gastrointestinal:  abdominal pain , N/V, diarrhea, dysphagia, constipation, bleeding Genitourinary:  frequency, urgency, dysuria, hematuria, incontinence Muskuloskeletal :  arthralgia, myalgia, back pain Hematology:  easy bruising, nose or gum bleeding, lymphadenopathy Dermatological: rash, ulceration, pruritis, color change / jaundice Endocrine:   hot flashes or polydipsia Neurological:  headache, dizziness, confusion, focal weakness, paresthesia, Speech difficulties, memory loss, gait difficulty Psychological: Feelings of anxiety, depression, agitation Objective: VITALS:   
 Visit Vitals /78 Pulse (!) 111 Temp 98.1 °F (36.7 °C) Resp 24 Ht 5' 10.5\" (1.791 m) Wt 94.3 kg (208 lb) SpO2 92% BMI 29.42 kg/m² PHYSICAL EXAM: 
 
General:    Alert, cooperative, no distress, appears stated age. HEENT: Atraumatic, anicteric sclerae, pink conjunctivae No oral ulcers, mucosa moist, throat clear, dentition fair Neck:  Supple, symmetrical,  thyroid: non tender Lungs:   Clear to auscultation bilaterally. No Wheezing or Rhonchi. No rales. Chest wall:  No tenderness  No Accessory muscle use. Heart:   Tachycardic, irregular rhythm,  No  murmur   No edema Abdomen:   Soft, non-tender. mildly distended. Bowel sounds normal 
Extremities: No cyanosis. No clubbing,   
  Skin turgor normal, Capillary refill normal, Radial dial pulse 2+ Skin:     Pale. Not Jaundiced  No rashes Psych:  Good insight. Not depressed. Not anxious or agitated. Neurologic: EOMs intact. No facial asymmetry. No aphasia or slurred speech. Symmetrical strength, Sensation grossly intact. Alert and oriented X 4.  
 
_______________________________________________________________________ Care Plan discussed with: 
  Comments Patient x Family  x Wife at bedside RN x Care Manager Consultant:     
_______________________________________________________________________ Expected  Disposition:  
Home with Family x HH/PT/OT/RN   
SNF/LTC   
LISA   
________________________________________________________________________ TOTAL TIME:  45 Minutes Critical Care Provided     Minutes non procedure based Comments  
 x Reviewed previous records  
>50% of visit spent in counseling and coordination of care x Discussion with patient and/or family and questions answered 
  
 
________________________________________________________________________ Signed: uJsto Diamond MD 
 
Procedures: see electronic medical records for all procedures/Xrays and details which were not copied into this note but were reviewed prior to creation of Plan. LAB DATA REVIEWED:   
Recent Results (from the past 24 hour(s)) EKG, 12 LEAD, INITIAL Collection Time: 11/06/18  1:32 PM  
Result Value Ref Range Ventricular Rate 157 BPM  
 Atrial Rate 178 BPM  
 QRS Duration 90 ms Q-T Interval 296 ms QTC Calculation (Bezet) 478 ms Calculated R Axis 48 degrees Calculated T Axis 21 degrees Diagnosis Atrial fibrillation with rapid ventricular response Nonspecific ST abnormality When compared with ECG of 07-APR-2009 08:45, Atrial fibrillation has replaced Sinus rhythm Vent. rate has increased BY  98 BPM 
ST now depressed in Inferior leads ST now depressed in Anterolateral leads Confirmed by Maddie Flores ([id number]) on 11/6/2018 3:10:58 PM 
  
CBC WITH AUTOMATED DIFF Collection Time: 11/06/18  1:38 PM  
Result Value Ref Range WBC 11.2 (H) 4.1 - 11.1 K/uL  
 RBC 5.70 4. 10 - 5.70 M/uL  
 HGB 15.9 12.1 - 17.0 g/dL HCT 44.9 36.6 - 50.3 % MCV 78.8 (L) 80.0 - 99.0 FL  
 MCH 27.9 26.0 - 34.0 PG  
 MCHC 35.4 30.0 - 36.5 g/dL  
 RDW 12.9 11.5 - 14.5 % PLATELET 699 (H) 175 - 400 K/uL MPV 9.4 8.9 - 12.9 FL  
 NRBC 0.0 0  WBC ABSOLUTE NRBC 0.00 0.00 - 0.01 K/uL NEUTROPHILS 76 (H) 32 - 75 % LYMPHOCYTES 11 (L) 12 - 49 % MONOCYTES 11 5 - 13 % EOSINOPHILS 2 0 - 7 % BASOPHILS 0 0 - 1 % IMMATURE GRANULOCYTES 0 0.0 - 0.5 % ABS. NEUTROPHILS 8.5 (H) 1.8 - 8.0 K/UL  
 ABS. LYMPHOCYTES 1.2 0.8 - 3.5 K/UL  
 ABS. MONOCYTES 1.2 (H) 0.0 - 1.0 K/UL  
 ABS. EOSINOPHILS 0.2 0.0 - 0.4 K/UL  
 ABS. BASOPHILS 0.1 0.0 - 0.1 K/UL  
 ABS. IMM. GRANS. 0.0 0.00 - 0.04 K/UL  
 DF AUTOMATED METABOLIC PANEL, COMPREHENSIVE Collection Time: 11/06/18  1:38 PM  
Result Value Ref Range Sodium 125 (L) 136 - 145 mmol/L Potassium 2.8 (L) 3.5 - 5.1 mmol/L Chloride 82 (L) 97 - 108 mmol/L  
 CO2 33 (H) 21 - 32 mmol/L  Anion gap 10 5 - 15 mmol/L  
 Glucose 208 (H) 65 - 100 mg/dL BUN 18 6 - 20 MG/DL Creatinine 1.31 (H) 0.70 - 1.30 MG/DL  
 BUN/Creatinine ratio 14 12 - 20 GFR est AA >60 >60 ml/min/1.73m2 GFR est non-AA 54 (L) >60 ml/min/1.73m2 Calcium 9.3 8.5 - 10.1 MG/DL Bilirubin, total 0.9 0.2 - 1.0 MG/DL  
 ALT (SGPT) 32 12 - 78 U/L  
 AST (SGOT) 17 15 - 37 U/L Alk. phosphatase 84 45 - 117 U/L Protein, total 8.0 6.4 - 8.2 g/dL Albumin 3.6 3.5 - 5.0 g/dL Globulin 4.4 (H) 2.0 - 4.0 g/dL A-G Ratio 0.8 (L) 1.1 - 2.2 CK W/ REFLX CKMB Collection Time: 11/06/18  1:38 PM  
Result Value Ref Range CK 72 39 - 308 U/L  
TROPONIN I Collection Time: 11/06/18  1:38 PM  
Result Value Ref Range Troponin-I, Qt. <0.05 <0.05 ng/mL SAMPLES BEING HELD Collection Time: 11/06/18  1:38 PM  
Result Value Ref Range SAMPLES BEING HELD 1BLUE   
 COMMENT Add-on orders for these samples will be processed based on acceptable specimen integrity and analyte stability, which may vary by analyte.

## 2018-11-06 NOTE — ROUTINE PROCESS
TRANSFER - IN REPORT: 
 
Verbal report received from [facility], [address] (name) on Carey Olivo  being received from ED(unit) for routine progression of care Report consisted of patients Situation, Background, Assessment and  
Recommendations(SBAR). Information from the following report(s) SBAR, ED Summary, Intake/Output, MAR and Recent Results was reviewed with the receiving nurse. Opportunity for questions and clarification was provided. Assessment completed upon patients arrival to unit and care assumed.

## 2018-11-06 NOTE — ROUTINE PROCESS
Primary Nurse Milton Sharif and Ady Zapien, RN performed a dual skin assessment on this patient No impairment noted Rj score is 23

## 2018-11-06 NOTE — ED NOTES
Patient's rhythm A. Fib/A. Flutter per MD Elier Enamorado. Will begin diltiazem. MD Elier Enamorado remains at bedside.

## 2018-11-06 NOTE — ED NOTES
Patient presents to ED via EMS c/o chest discomfort that started last night at 11:00pm. Patient went to his PCP this morning for symptoms and was in A. Fib with no previous history. Patient took 324 of ASA in route. Patient on the monitor x3, call bell within reach. Side rails x2.

## 2018-11-06 NOTE — PROGRESS NOTES
PCU SHIFT NURSING NOTE Shift Summary:  
Patient arrived to PCU. /64   Pulse 89   Temp 98.1 °F (36.7 °C)   Resp 19   Ht 5' 10.5\" (1.791 m)   Wt 94.3 kg (208 lb)   SpO2 96%   BMI 29.42 kg/m² A/Ox4. Patient in A-fib on the monitor with rate of 100-125. With activity rate goes up to 150. Lung sounds clear. Patient is ambulatory. Able to move all extremities without sign of weakness. Patient is on bed rest. Understands that he is call out for assistance with toileting. 1808 Adjusted cardizem drip to 15 mg/hr due to patient not meeting parameters requested on the 12.5 mg/hr drip rate. 1930 Bedside and Verbal shift change report given to [employer], RN (oncoming nurse) by Franky Stephens RN (offgoing nurse). Report included the following information SBAR, ED Summary, Intake/Output, MAR and Recent Results. Admission Date 11/6/2018 Admission Diagnosis Atrial fibrillation ([facility] [address] Consults IP CONSULT TO CARDIOLOGY Consults []PT []OT []Speech  
[]Case Management  
  
[] Palliative Cardiac Monitoring Order  
[x]Yes []No  
 
IV drips [x]Yes Drip:     Cardizem                       Dose: 
Drip:          KCL in NS                  Dose: 
Drip:                            Dose:  
[]No  
 
GI Prophylaxis [x]Yes []No  
 
 
 
DVT Prophylaxis SCDs:     
     
 Kurt stockings:     
  
[x] Medication []Contraindicated []None Activity Level Purposeful Rounding every 1-2 hour? [x]Yes Arnold Score  Total Score: 1 Bed Alarm (If score 3 or >) []Yes  
[] Refused (See signed refusal form in chart) Rj Score  Rj Score: 23 Rj Score (if score 14 or less) []PMT consult  
[]Wound Care consult []Specialty bed  
[] Nutrition consult Needs prior to discharge:  
Home O2 required:   
[]Yes  
[x]No  
 If yes, how much O2 required? Other:  
 Last Bowel Movement: Last Bowel Movement Date: 11/05/18 Influenza Vaccine Pneumonia Vaccine Diet Active Orders Diet DIET DIABETIC CONSISTENT CARB Regular; 2 GM NA (House Low NA) LDAs Peripheral IV 11/06/18 Right Antecubital (Active) Site Assessment Clean, dry, & intact 11/6/2018  1:42 PM  
Phlebitis Assessment 0 11/6/2018  1:42 PM  
Infiltration Assessment 0 11/6/2018  1:42 PM  
Dressing Status Clean, dry, & intact 11/6/2018  1:42 PM  
Dressing Type Transparent;Tape 11/6/2018  1:42 PM  
Hub Color/Line Status Pink;Flushed 11/6/2018  1:42 PM  
Action Taken Blood drawn 11/6/2018  1:42 PM  
   
Peripheral IV 11/06/18 Left Antecubital (Active) Site Assessment Clean, dry, & intact 11/6/2018  2:25 PM  
Phlebitis Assessment 0 11/6/2018  2:25 PM  
Infiltration Assessment 0 11/6/2018  2:25 PM  
Dressing Status Clean, dry, & intact 11/6/2018  2:25 PM  
Dressing Type Transparent;Tape 11/6/2018  2:25 PM  
Hub Color/Line Status Pink;Flushed 11/6/2018  2:25 PM  
Action Taken Blood drawn 11/6/2018  2:25 PM  
                  
Urinary Catheter Intake & Output Date 11/05/18 0700 - 11/06/18 0659(Not Admitted) 11/06/18 0700 - 11/07/18 6575 Shift 7317-6463 5496-4138 24 Hour Total 8591-5223 2512-1016 24 Hour Total  
INTAKE  
I.V.    1000  1000 Volume (sodium chloride 0.9 % bolus infusion 1,000 mL)    1000  1000 Shift Total(mL/kg)    1000(10.6)  1000(10.6) OUTPUT Shift Total(mL/kg) NET    1000  1000 Weight (kg)    94.3 94.3 94.3 Readmission Risk Assessment Tool Score Low Risk   
      
 5 Total Score   
  
 5 Pt. Coverage (Medicare=5 , Medicaid, or Self-Pay=4) Criteria that do not apply:  
 Has Seen PCP in Last 6 Months (Yes=3, No=0) . Living with Significant Other. Assisted Living. LTAC. SNF. or  
Rehab Patient Length of Stay (>5 days = 3) IP Visits Last 12 Months (1-3=4, 4=9, >4=11) Charlson Comorbidity Score (Age + Comorbid Conditions) Expected Length of Stay - - - Actual Length of Stay 0

## 2018-11-07 LAB
ANION GAP SERPL CALC-SCNC: 10 MMOL/L (ref 5–15)
BUN SERPL-MCNC: 14 MG/DL (ref 6–20)
BUN/CREAT SERPL: 14 (ref 12–20)
CALCIUM SERPL-MCNC: 8.2 MG/DL (ref 8.5–10.1)
CHLORIDE SERPL-SCNC: 93 MMOL/L (ref 97–108)
CO2 SERPL-SCNC: 28 MMOL/L (ref 21–32)
COMMENT, HOLDF: NORMAL
CREAT SERPL-MCNC: 0.97 MG/DL (ref 0.7–1.3)
ERYTHROCYTE [DISTWIDTH] IN BLOOD BY AUTOMATED COUNT: 13 % (ref 11.5–14.5)
EST. AVERAGE GLUCOSE BLD GHB EST-MCNC: 154 MG/DL
GLUCOSE BLD STRIP.AUTO-MCNC: 111 MG/DL (ref 65–100)
GLUCOSE BLD STRIP.AUTO-MCNC: 126 MG/DL (ref 65–100)
GLUCOSE BLD STRIP.AUTO-MCNC: 147 MG/DL (ref 65–100)
GLUCOSE BLD STRIP.AUTO-MCNC: 156 MG/DL (ref 65–100)
GLUCOSE SERPL-MCNC: 100 MG/DL (ref 65–100)
HBA1C MFR BLD: 7 % (ref 4.2–6.3)
HCT VFR BLD AUTO: 35.8 % (ref 36.6–50.3)
HGB BLD-MCNC: 12.4 G/DL (ref 12.1–17)
MAGNESIUM SERPL-MCNC: 2 MG/DL (ref 1.6–2.4)
MCH RBC QN AUTO: 27.4 PG (ref 26–34)
MCHC RBC AUTO-ENTMCNC: 34.6 G/DL (ref 30–36.5)
MCV RBC AUTO: 79.2 FL (ref 80–99)
NRBC # BLD: 0 K/UL (ref 0–0.01)
NRBC BLD-RTO: 0 PER 100 WBC
PHOSPHATE SERPL-MCNC: 3.6 MG/DL (ref 2.6–4.7)
PLATELET # BLD AUTO: 343 K/UL (ref 150–400)
PMV BLD AUTO: 9.3 FL (ref 8.9–12.9)
POTASSIUM SERPL-SCNC: 3 MMOL/L (ref 3.5–5.1)
RBC # BLD AUTO: 4.52 M/UL (ref 4.1–5.7)
SAMPLES BEING HELD,HOLD: NORMAL
SERVICE CMNT-IMP: ABNORMAL
SODIUM SERPL-SCNC: 131 MMOL/L (ref 136–145)
TSH SERPL DL<=0.05 MIU/L-ACNC: 1.93 UIU/ML (ref 0.36–3.74)
WBC # BLD AUTO: 7.7 K/UL (ref 4.1–11.1)

## 2018-11-07 PROCEDURE — 74011250637 HC RX REV CODE- 250/637: Performed by: INTERNAL MEDICINE

## 2018-11-07 PROCEDURE — 84443 ASSAY THYROID STIM HORMONE: CPT | Performed by: INTERNAL MEDICINE

## 2018-11-07 PROCEDURE — 65660000000 HC RM CCU STEPDOWN

## 2018-11-07 PROCEDURE — 84100 ASSAY OF PHOSPHORUS: CPT | Performed by: INTERNAL MEDICINE

## 2018-11-07 PROCEDURE — 83036 HEMOGLOBIN GLYCOSYLATED A1C: CPT | Performed by: INTERNAL MEDICINE

## 2018-11-07 PROCEDURE — 83735 ASSAY OF MAGNESIUM: CPT | Performed by: INTERNAL MEDICINE

## 2018-11-07 PROCEDURE — 74011250636 HC RX REV CODE- 250/636: Performed by: INTERNAL MEDICINE

## 2018-11-07 PROCEDURE — 85027 COMPLETE CBC AUTOMATED: CPT | Performed by: INTERNAL MEDICINE

## 2018-11-07 PROCEDURE — 36415 COLL VENOUS BLD VENIPUNCTURE: CPT | Performed by: INTERNAL MEDICINE

## 2018-11-07 PROCEDURE — 74011000250 HC RX REV CODE- 250: Performed by: EMERGENCY MEDICINE

## 2018-11-07 PROCEDURE — 80048 BASIC METABOLIC PNL TOTAL CA: CPT | Performed by: INTERNAL MEDICINE

## 2018-11-07 PROCEDURE — 74011000258 HC RX REV CODE- 258: Performed by: EMERGENCY MEDICINE

## 2018-11-07 PROCEDURE — 82962 GLUCOSE BLOOD TEST: CPT

## 2018-11-07 PROCEDURE — 74011636637 HC RX REV CODE- 636/637: Performed by: INTERNAL MEDICINE

## 2018-11-07 PROCEDURE — 93306 TTE W/DOPPLER COMPLETE: CPT

## 2018-11-07 RX ORDER — POTASSIUM CHLORIDE 750 MG/1
40 TABLET, FILM COATED, EXTENDED RELEASE ORAL
Status: COMPLETED | OUTPATIENT
Start: 2018-11-07 | End: 2018-11-07

## 2018-11-07 RX ORDER — ENOXAPARIN SODIUM 100 MG/ML
40 INJECTION SUBCUTANEOUS DAILY
Status: DISCONTINUED | OUTPATIENT
Start: 2018-11-08 | End: 2018-11-08 | Stop reason: HOSPADM

## 2018-11-07 RX ORDER — DILTIAZEM HYDROCHLORIDE 180 MG/1
180 CAPSULE, COATED, EXTENDED RELEASE ORAL DAILY
Status: DISCONTINUED | OUTPATIENT
Start: 2018-11-07 | End: 2018-11-08 | Stop reason: HOSPADM

## 2018-11-07 RX ADMIN — ATORVASTATIN CALCIUM 10 MG: 10 TABLET, FILM COATED ORAL at 09:39

## 2018-11-07 RX ADMIN — SODIUM CHLORIDE AND POTASSIUM CHLORIDE: 9; 1.49 INJECTION, SOLUTION INTRAVENOUS at 02:53

## 2018-11-07 RX ADMIN — Medication 10 ML: at 13:49

## 2018-11-07 RX ADMIN — Medication 10 ML: at 21:26

## 2018-11-07 RX ADMIN — SODIUM CHLORIDE AND POTASSIUM CHLORIDE: 9; 1.49 INJECTION, SOLUTION INTRAVENOUS at 13:49

## 2018-11-07 RX ADMIN — NORTRIPTYLINE HYDROCHLORIDE 20 MG: 10 CAPSULE ORAL at 21:26

## 2018-11-07 RX ADMIN — SODIUM CHLORIDE AND POTASSIUM CHLORIDE: 9; 1.49 INJECTION, SOLUTION INTRAVENOUS at 21:22

## 2018-11-07 RX ADMIN — INSULIN LISPRO 2 UNITS: 100 INJECTION, SOLUTION INTRAVENOUS; SUBCUTANEOUS at 13:02

## 2018-11-07 RX ADMIN — MULTIPLE VITAMINS W/ MINERALS TAB 1 TABLET: TAB at 09:38

## 2018-11-07 RX ADMIN — ASPIRIN 81 MG CHEWABLE TABLET 81 MG: 81 TABLET CHEWABLE at 09:38

## 2018-11-07 RX ADMIN — VITAMIN D, TAB 1000IU (100/BT) 1000 UNITS: 25 TAB at 09:38

## 2018-11-07 RX ADMIN — Medication 10 ML: at 05:14

## 2018-11-07 RX ADMIN — POTASSIUM CHLORIDE 40 MEQ: 750 TABLET, FILM COATED, EXTENDED RELEASE ORAL at 09:39

## 2018-11-07 RX ADMIN — DILTIAZEM HYDROCHLORIDE 180 MG: 180 CAPSULE, COATED, EXTENDED RELEASE ORAL at 09:38

## 2018-11-07 RX ADMIN — INSULIN LISPRO 2 UNITS: 100 INJECTION, SOLUTION INTRAVENOUS; SUBCUTANEOUS at 17:35

## 2018-11-07 RX ADMIN — DILTIAZEM HYDROCHLORIDE 5 MG/HR: 5 INJECTION, SOLUTION INTRAVENOUS at 01:09

## 2018-11-07 NOTE — PROGRESS NOTES
Progress Note 11/7/2018 8:59 AM 
NAME: Kadi Coleman MRN:  [id number] Admit Diagnosis: Atrial fibrillation ([facility] [address] Assessment:   
  
1. Recent viral illness with fevers, abd pain, presents with hyponatremia, hypokalemia with afib with RVR 2. No hx of CAD 3. Unknown EF 4. Prox afib spont back in sinus 5. HTN 
6. Dyslipidemia 7. Distant carotid doppler with moderate right disease 8. Hx of JEISON, no longer on CPAP after weight loss 9. Hx of colonic polyps 10. Asthma 11. , retired from [employer] system, active in [facility]:    
  
Recent viral illness with hyponatremia, hypokalemia. Symptomatic afib with RVR spont back in sinus CHADS2 score of 1 
  
TSH ok Check echo 
  
1. Cont asa 2. Change cardizem ggt to cd 180mg daily 3. Holding micardis, resume on discharge 4. Would stop indapamide, replete K, may be able to stop hydration today 5. Cont statin As long as EF ok, would be ok to discharge on above regimen. Follow up with me in two weeks. 
  
   
  
 [x]        High complexity decision making was performed 
  
 
 
 
 
Subjective:  
 
Kadi Coleman denies chest pain, dyspnea. Discussed with RN events overnight. Review of Systems: 
 
Symptom Y/N Comments  Symptom Y/N Comments Fever/Chills N   Chest Pain N Poor Appetite N   Edema N   
Cough N   Abdominal Pain N Sputum N   Joint Pain N   
SOB/PIZANO N   Pruritis/Rash N   
Nausea/vomit N   Tolerating PT/OT Y Diarrhea N   Tolerating Diet Y Constipation N   Other Could NOT obtain due to:   
 
Objective:  
  
Physical Exam: 
 
Last 24hrs VS reviewed since prior progress note. Most recent are: 
 
Visit Vitals /63 (BP 1 Location: Left arm, BP Patient Position: At rest) Pulse 71 Temp 98.2 °F (36.8 °C) Resp 20 Ht 5' 10.5\" (1.791 m) Wt 94.3 kg (208 lb) SpO2 96% BMI 29.42 kg/m² Intake/Output Summary (Last 24 hours) at 11/7/2018 8890 Last data filed at 11/7/2018 0800 Gross per 24 hour Intake 3531.83 ml Output 1450 ml Net 2081.83 ml General Appearance: Well developed, well nourished, alert & oriented x 3,  
 no acute distress. Ears/Nose/Mouth/Throat: Hearing grossly normal. 
Neck: Supple. Chest: Lungs clear to auscultation bilaterally. Cardiovascular: Regular rate and rhythm, S1S2 normal, no murmur. Abdomen: Soft, non-tender, bowel sounds are active. Extremities: No edema bilaterally. Skin: Warm and dry. PMH/SH reviewed - no change compared to H&P Data Review Telemetry: normal sinus rhythm Lab Data Personally Reviewed: 
 
Recent Labs 11/07/18 
0247 11/06/18 
1338 WBC 7.7 11.2* HGB 12.4 15.9 HCT 35.8* 44.9  416* No results for input(s): INR, PTP, APTT in the last 72 hours. No lab exists for component: INREXT Recent Labs 11/07/18 
0247 11/06/18 
2153 11/06/18 
1338 * 129* 125* K 3.0* 3.0* 2.8*  
CL 93* 91* 82* CO2 28 31 33* BUN 14 16 18 CREA 0.97 1.17 1.31*  145* 208* CA 8.2* 8.4* 9.3 MG 2.0 2.0  --   
 
Recent Labs 11/06/18 
1338 TROIQ <0.05 No results found for: CHOL, CHOLX, CHLST, CHOLV, HDL, LDL, LDLC, DLDLP, TGLX, TRIGL, TRIGP, CHHD, CHHDX Recent Labs 11/06/18 
1338 SGOT 17  
AP 84  
TP 8.0 ALB 3.6 GLOB 4.4* No results for input(s): PH, PCO2, PO2 in the last 72 hours. Medications Personally Reviewed: 
 
Current Facility-Administered Medications Medication Dose Route Frequency  dilTIAZem CD (CARDIZEM CD) capsule 180 mg  180 mg Oral DAILY  potassium chloride SR (KLOR-CON 10) tablet 40 mEq  40 mEq Oral NOW  aspirin chewable tablet 81 mg  81 mg Oral DAILY  cholecalciferol (VITAMIN D3) tablet 1,000 Units  1,000 Units Oral DAILY  nortriptyline (PAMELOR) capsule 20 mg  20 mg Oral QHS  atorvastatin (LIPITOR) tablet 10 mg  10 mg Oral DAILY  multivitamin, tx-iron-ca-min (THERA-M w/ IRON) tablet 1 Tab  1 Tab Oral DAILY  sodium chloride (NS) flush 5-10 mL  5-10 mL IntraVENous Q8H  
 sodium chloride (NS) flush 5-10 mL  5-10 mL IntraVENous PRN  
 acetaminophen (TYLENOL) tablet 650 mg  650 mg Oral Q4H PRN  
 ondansetron (ZOFRAN) injection 4 mg  4 mg IntraVENous Q4H PRN  
 bisacodyl (DULCOLAX) tablet 5 mg  5 mg Oral DAILY PRN  
 insulin lispro (HUMALOG) injection   SubCUTAneous AC&HS  
 glucose chewable tablet 16 g  4 Tab Oral PRN  
 dextrose (D50W) injection syrg 12.5-25 g  12.5-25 g IntraVENous PRN  
 glucagon (GLUCAGEN) injection 1 mg  1 mg IntraMUSCular PRN  
 0.9% sodium chloride with KCl 20 mEq/L infusion   IntraVENous CONTINUOUS Compa Regalado MD

## 2018-11-07 NOTE — CONSULTS
Consult    NAME: Francesca Bobo   :  1945   MRN:  [id number]     Date/Time:  2018 7:42 PM    Patient PCP: Kia Meehan MD  ________________________________________________________________________     Assessment:     1. Recent viral illness with fevers, abd pain, presents with hyponatremia, hypokalemia with afib with RVR  2. No hx of CAD  3. Unknown EF  4. Prox afib spont back in sinus  5. HTN  6. Dyslipidemia  7. Distant carotid doppler with moderate right disease  8. Hx of JEISON, no longer on CPAP after weight loss  9. Hx of colonic polyps  10. Asthma  11. , retired from [employer] system, active in [facility]:     Recent viral illness with hyponatremia, hypokalemia. Symptomatic afib with RVR spont back in sinus  CHADS2 score of 1    Check TSH  Check echo    1. Given lovenox tonight  2. Cont asa  3. Cont cardizem ggt tonight, change to po tomorrow  4. Holding micardis  5. Holding indapamide, hydration today  6. Cont statin          [x]        High complexity decision making was performed        Subjective:   CHIEF COMPLAINT: Dyspnea    HISTORY OF PRESENT ILLNESS:       Jaret Negron is a 72 y.o.  male who presents with above. Pt says he has been feeling poorly since last week. He complains of a \"stomach flu\" in which he had severe abdominal pain for a few days. He denies diarrhea with this. He did have loss of appetite and was not eating well, but was drinking soup and water. He recovered from this and was feeling better until a couple of days ago. Then, he developed chest pain throughout his precordium radiating into his neck. No radiation to his arms. He has PIZANO which is unusual for him. He denies nausea or stomach pain currently. He does endorse lightheadedness and dizziness. No new edema or focal weakness. He finally became concerned enough to see his PCP today but was found to have tachycardia and sent to the ER.     Currently in bed, no chest pain, did not realize back in sinus. We were asked to consult for work up and evaluation of the above problems. Past Medical History:   Diagnosis Date    Asthma     as a child    Diabetes ([address]     diet controlled    Hyperlipidemia     Hypertension     JEISON (obstructive sleep apnea)     lost weight, no longer on CPAP      Past Surgical History:   Procedure Laterality Date    HX GI      polypectomy    HX HEENT      sinus surgery    HX ORTHOPAEDIC  2009    lt shoulder replacement     No Known Allergies   Meds:  See below  Social History     Tobacco Use    Smoking status: Never Smoker   Substance Use Topics    Alcohol use: No      Family History   Problem Relation Age of Onset    Coronary Artery Disease Mother     Coronary Artery Disease Father     Coronary Artery Disease Sister     Diabetes Sister         2 sisters \"prediabetes\"       REVIEW OF SYSTEMS:     []         Unable to obtain  ROS due to ---   [x]         Total of 12 systems reviewed as follows:     Total of 12 systems reviewed as follows:       POSITIVE= Bold text  Negative = normal text  General:  fever, chills, sweats, generalized weakness, weight loss/gain,      loss of appetite   Eyes:    blurred vision, eye pain, loss of vision, double vision  ENT:    rhinorrhea, pharyngitis   Respiratory:   cough, sputum production, SOB, PIZANO, wheezing, pleuritic pain   Cardiology:   chest pain, palpitations, orthopnea, PND, edema, syncope   Gastrointestinal:  abdominal pain , N/V, diarrhea, dysphagia, constipation, bleeding   Genitourinary:  frequency, urgency, dysuria, hematuria, incontinence   Muskuloskeletal :  arthralgia, myalgia, back pain  Hematology:  easy bruising, nose or gum bleeding, lymphadenopathy   Dermatological: rash, ulceration, pruritis, color change / jaundice  Endocrine:   hot flashes or polydipsia   Neurological:  headache, dizziness, confusion, focal weakness, paresthesia,     Speech difficulties, memory loss, gait difficulty  Psychological: Feelings of anxiety, depression, agitation    Objective:      Physical Exam:    Last 24hrs VS reviewed since prior progress note. Most recent are:    Visit Vitals  /64   Pulse 89   Temp 98.1 °F (36.7 °C)   Resp 19   Ht 5' 10.5\" (1.791 m)   Wt 94.3 kg (208 lb)   SpO2 96%   BMI 29.42 kg/m²       Intake/Output Summary (Last 24 hours) at 11/6/2018 1942  Last data filed at 11/6/2018 1450  Gross per 24 hour   Intake 1000 ml   Output    Net 1000 ml        General Appearance: Well developed, well nourished, alert & oriented x 3,    no acute distress. Ears/Nose/Mouth/Throat: Pupils equal and round, Hearing grossly normal.  Neck: Supple. JVP within normal limits. Carotids good upstrokes, with no bruit. Chest: Lungs clear to auscultation bilaterally. Cardiovascular: Regular rate and rhythm, S1S2 normal, no murmur, rubs, gallops. Abdomen: Soft, non-tender, bowel sounds are active. No organomegaly. Extremities: No edema bilaterally. Femoral pulses +2, Distal Pulses +1. Skin: Warm and dry. Neuro: CN II-XII grossly intact, Strength and sensation grossly intact. Data:      Prior to Admission medications    Medication Sig Start Date End Date Taking? Authorizing Provider   indapamide (LOZOL) 2.5 mg tablet Take 2.5 mg by mouth daily. Yes Other, MD Luigi   ibuprofen (MOTRIN) 800 mg tablet Take 800 mg by mouth every eight (8) hours as needed for Pain. Yes Other, MD Luigi   cholecalciferol (VITAMIN D3) 1,000 unit tablet Take 1,000 Units by mouth daily. Yes Other, MD Luigi   vit C/E/Zn/coppr/lutein/zeaxan (PRESERVISION AREDS-2 PO) Take 1 Tab by mouth daily. Yes Other, MD Luigi   nortriptyline (PAMELOR) 10 mg capsule Take 20 mg by mouth nightly. Yes Provider, Historical   rosuvastatin (CRESTOR) 5 mg tablet Take 5 mg by mouth daily. Yes Provider, Historical   telmisartan (MICARDIS) 40 mg tablet Take 40 mg by mouth nightly.    Yes Provider, Historical   aspirin 81 mg chewable tablet Take 81 mg by mouth daily. Yes Provider, Historical       Recent Results (from the past 24 hour(s))   EKG, 12 LEAD, INITIAL    Collection Time: 11/06/18  1:32 PM   Result Value Ref Range    Ventricular Rate 157 BPM    Atrial Rate 178 BPM    QRS Duration 90 ms    Q-T Interval 296 ms    QTC Calculation (Bezet) 478 ms    Calculated R Axis 48 degrees    Calculated T Axis 21 degrees    Diagnosis       Atrial fibrillation with rapid ventricular response  Nonspecific ST abnormality  When compared with ECG of 07-APR-2009 08:45,  Atrial fibrillation has replaced Sinus rhythm  Vent. rate has increased BY  98 BPM  ST now depressed in Inferior leads  ST now depressed in Anterolateral leads  Confirmed by Kimberly Barr ([id number]) on 11/6/2018 3:10:58 PM     CBC WITH AUTOMATED DIFF    Collection Time: 11/06/18  1:38 PM   Result Value Ref Range    WBC 11.2 (H) 4.1 - 11.1 K/uL    RBC 5.70 4. 10 - 5.70 M/uL    HGB 15.9 12.1 - 17.0 g/dL    HCT 44.9 36.6 - 50.3 %    MCV 78.8 (L) 80.0 - 99.0 FL    MCH 27.9 26.0 - 34.0 PG    MCHC 35.4 30.0 - 36.5 g/dL    RDW 12.9 11.5 - 14.5 %    PLATELET 958 (H) 720 - 400 K/uL    MPV 9.4 8.9 - 12.9 FL    NRBC 0.0 0  WBC    ABSOLUTE NRBC 0.00 0.00 - 0.01 K/uL    NEUTROPHILS 76 (H) 32 - 75 %    LYMPHOCYTES 11 (L) 12 - 49 %    MONOCYTES 11 5 - 13 %    EOSINOPHILS 2 0 - 7 %    BASOPHILS 0 0 - 1 %    IMMATURE GRANULOCYTES 0 0.0 - 0.5 %    ABS. NEUTROPHILS 8.5 (H) 1.8 - 8.0 K/UL    ABS. LYMPHOCYTES 1.2 0.8 - 3.5 K/UL    ABS. MONOCYTES 1.2 (H) 0.0 - 1.0 K/UL    ABS. EOSINOPHILS 0.2 0.0 - 0.4 K/UL    ABS. BASOPHILS 0.1 0.0 - 0.1 K/UL    ABS. IMM.  GRANS. 0.0 0.00 - 0.04 K/UL    DF AUTOMATED     METABOLIC PANEL, COMPREHENSIVE    Collection Time: 11/06/18  1:38 PM   Result Value Ref Range    Sodium 125 (L) 136 - 145 mmol/L    Potassium 2.8 (L) 3.5 - 5.1 mmol/L    Chloride 82 (L) 97 - 108 mmol/L    CO2 33 (H) 21 - 32 mmol/L    Anion gap 10 5 - 15 mmol/L    Glucose 208 (H) 65 - 100 mg/dL    BUN 18 6 - 20 MG/DL Creatinine 1.31 (H) 0.70 - 1.30 MG/DL    BUN/Creatinine ratio 14 12 - 20      GFR est AA >60 >60 ml/min/1.73m2    GFR est non-AA 54 (L) >60 ml/min/1.73m2    Calcium 9.3 8.5 - 10.1 MG/DL    Bilirubin, total 0.9 0.2 - 1.0 MG/DL    ALT (SGPT) 32 12 - 78 U/L    AST (SGOT) 17 15 - 37 U/L    Alk. phosphatase 84 45 - 117 U/L    Protein, total 8.0 6.4 - 8.2 g/dL    Albumin 3.6 3.5 - 5.0 g/dL    Globulin 4.4 (H) 2.0 - 4.0 g/dL    A-G Ratio 0.8 (L) 1.1 - 2.2     CK W/ REFLX CKMB    Collection Time: 11/06/18  1:38 PM   Result Value Ref Range    CK 72 39 - 308 U/L   TROPONIN I    Collection Time: 11/06/18  1:38 PM   Result Value Ref Range    Troponin-I, Qt. <0.05 <0.05 ng/mL   SAMPLES BEING HELD    Collection Time: 11/06/18  1:38 PM   Result Value Ref Range    SAMPLES BEING HELD 1BLUE     COMMENT        Add-on orders for these samples will be processed based on acceptable specimen integrity and analyte stability, which may vary by analyte.    GLUCOSE, POC    Collection Time: 11/06/18  6:18 PM   Result Value Ref Range    Glucose (POC) 172 (H) 65 - 100 mg/dL    Performed by Leslie Mercado (MARY ANN)

## 2018-11-07 NOTE — PROGRESS NOTES
PCU SHIFT NURSING NOTE Bedside and Verbal shift change report given to Vinny Isabel RN (oncoming nurse) by [address], RN (offgoing nurse). Report included the following information SBAR, Intake/Output, MAR and Recent Results. Shift Summary:  
Received report and assumed care of patient. /63 (BP 1 Location: Left arm, BP Patient Position: At rest)   Pulse 71   Temp 98.2 °F (36.8 °C)   Resp 20   Ht 5' 10.5\" (1.791 m)   Wt 94.3 kg (208 lb)   SpO2 96%   BMI 29.42 kg/m² A/Ox4. VSS. NSR on monitor. Room air. Lung sounds bilaterally clear. Patient reports no pain. Cardizem drip running at 5 ml/hr. 9437 Discontinued cardizem drip per order and initiated Cardizem PO. Administered 40 mEq of PO potassium for a K of 3.0. 
 
1135 Patient left unit for Echo. 
 
1300 Patient returned to unit from Echo. Patient up to chair for lunch and to visit with wife. HR has been normal so not enforcing order for bed rest. 
 
1930 Bedside and Verbal shift change report given to [address], RN (oncoming nurse) by Vinny Isabel RN (offgoing nurse). Report included the following information SBAR, Intake/Output, MAR and Recent Results. Admission Date 11/6/2018 Admission Diagnosis Atrial fibrillation ([facility] [address] Consults IP CONSULT TO CARDIOLOGY Consults []PT []OT []Speech  
[]Case Management  
  
[] Palliative Cardiac Monitoring Order  
[x]Yes []No  
 
IV drips [x]Yes Drip:Diltiazem                            Dose: titrate per parameter Drip:      NS with 20 mEq KCL                      Dose: 125 ml/hr Drip:                            Dose:  
[]No  
 
GI Prophylaxis [x]Yes []No  
 
 
 
DVT Prophylaxis SCDs:     
     
 Kurt stockings:     
  
[x] Medication []Contraindicated []None Activity Level Activity Level: Bed Rest   
 Activity Assistance: Partial (one person) Purposeful Rounding every 1-2 hour? [x]Yes Arnold Score  Total Score: 1 Bed Alarm (If score 3 or >) []Yes [] Refused (See signed refusal form in chart) Jr Score  Rj Score: 22 Rj Score (if score 14 or less) []PMT consult  
[]Wound Care consult []Specialty bed  
[] Nutrition consult Needs prior to discharge:  
Home O2 required:   
[]Yes  
[x]No  
 If yes, how much O2 required? Other:  
 Last Bowel Movement: Last Bowel Movement Date: 11/05/18 Influenza Vaccine Pneumonia Vaccine Diet Active Orders Diet DIET DIABETIC CONSISTENT CARB Regular; 2 GM NA (House Low NA) LDAs Peripheral IV 11/06/18 Right Antecubital (Active) Site Assessment Clean, dry, & intact 11/7/2018  8:00 AM  
Phlebitis Assessment 0 11/7/2018  8:00 AM  
Infiltration Assessment 0 11/7/2018  8:00 AM  
Dressing Status Clean, dry, & intact 11/7/2018  8:00 AM  
Dressing Type Tape;Transparent 11/7/2018  8:00 AM  
Hub Color/Line Status Pink; Infusing;Patent 11/7/2018  8:00 AM  
Action Taken Open ports on tubing capped 11/7/2018  8:00 AM  
Alcohol Cap Used Yes 11/7/2018  8:00 AM  
   
Peripheral IV 11/06/18 Left Antecubital (Active) Site Assessment Clean, dry, & intact 11/7/2018  8:00 AM  
Phlebitis Assessment 0 11/7/2018  8:00 AM  
Infiltration Assessment 0 11/7/2018  8:00 AM  
Dressing Status Clean, dry, & intact 11/7/2018  8:00 AM  
Dressing Type Tape;Transparent 11/7/2018  8:00 AM  
Hub Color/Line Status Pink; Infusing;Patent 11/7/2018  8:00 AM  
Action Taken Open ports on tubing capped 11/7/2018  8:00 AM  
Alcohol Cap Used Yes 11/7/2018  8:00 AM  
                  
Urinary Catheter Intake & Output Date 11/06/18 0700 - 11/07/18 0659 11/07/18 0700 - 11/08/18 9595 Shift [id number] 1900-0659 24 Hour Total 0392-1549 8632-5158 24 Hour Total  
INTAKE  
P.O. 240 600 840     
  P. O. 240 600 840     
I. V.(mL/kg/hr) 1000(0.9) 58.5(0.1) 1058.5(0.5) 1633.3  1633.3 Cardizem Volume  58.5 58.5 85.4  85.4 Volume (0.9% sodium chloride with KCl 20 mEq/L infusion)    1547. 9  1547.9 Volume (sodium chloride 0.9 % bolus infusion 1,000 mL) 1000  1000 Shift Total(mL/kg) 9224(39.3) 658.5(7) 1898. 5(20.1) 1633.3(17.3)  1633.3(17.3) OUTPUT Urine(mL/kg/hr)  1100(1) 1100(0.5) 350  350 Urine Voided  1100 1100 350  350 Urine Occurrence(s) 1 x  1 x Shift Total(mL/kg)  1100(11.7) 1100(11.7) 350(3.7)  350(3.7) NET 1240 -441. 5 798.5 1283.3  1283.3 Weight (kg) 94.3 94.3 94.3 94.3 94.3 94.3 Readmission Risk Assessment Tool Score Low Risk   
      
 5 Total Score   
  
 5 Pt. Coverage (Medicare=5 , Medicaid, or Self-Pay=4) Criteria that do not apply:  
 Has Seen PCP in Last 6 Months (Yes=3, No=0) . Living with Significant Other. Assisted Living. LTAC. SNF. or  
Rehab Patient Length of Stay (>5 days = 3) IP Visits Last 12 Months (1-3=4, 4=9, >4=11) Charlson Comorbidity Score (Age + Comorbid Conditions) Expected Length of Stay - - - Actual Length of Stay 1

## 2018-11-07 NOTE — PROGRESS NOTES
Hospitalist Progress Note NAME: Saeed Lim :  1945 MRN:  [id number] Assessment / Plan: 
New-onset atrial fibrillation with RVR / HTN  
-spontaneously back to NSR  
-he was still on Cardizem gtt when seen in am  
-TSH 1.93  
-Follow ECHO  
-cardiology help appreciated: CHADS2 score of 1- cont ASA Change cardizem ggt to cd 180mg daily Holding micardis, resume on discharge Would stop indapamide Severe hypokalemia presumed due to recent gastroenteritis Hyponatremia 
-improving  
-continue to supplement aggressively as needed DM type II controlled with diet with hyperglycemia 
-BS better, down to 140s  
hba1c 7.0 
-SS as needed 
-continue with diet alone, re check hba1c in 4 months Hyperlipidemia: con't statin JEISON: says he lost weight and told he no longer needs CPAP but wife endorses snoring 
- consider referral for repeat outpatient sleep testing 
  
Code Status: Full Surrogate Decision Maker: wife Lyly Murphy cell [phone] DVT Prophylaxis: lovenox 
  
Baseline: independent Recommended Disposition: Home w/Family once cleared by cardiology Subjective: Chief Complaint / Reason for Physician Visit: Afib / DM Pt was seen earlier today during am rounding Discussed with RN events overnight. Review of Systems: 
Symptom Y/N Comments  Symptom Y/N Comments Fever/Chills n   Chest Pain n   
Poor Appetite    Edema Cough    Abdominal Pain n   
Sputum    Joint Pain SOB/PIZANO n   Pruritis/Rash Nausea/vomit    Tolerating PT/OT Diarrhea    Tolerating Diet Constipation    Other Could NOT obtain due to:   
 
Objective: VITALS:  
Last 24hrs VS reviewed since prior progress note. Most recent are: 
Patient Vitals for the past 24 hrs: 
 Temp Pulse Resp BP SpO2  
18 1515 98.7 °F (37.1 °C) 71 16 112/86 97 % 18 1330 99 °F (37.2 °C) 71 18 129/57 97 % 18 1107 98.1 °F (36.7 °C) 71 18 132/55 97 % 11/07/18 0800 98.2 °F (36.8 °C) 71 20 135/63 96 % 11/07/18 0330 99 °F (37.2 °C) 65 20 123/55 95 % 11/06/18 2300 99.2 °F (37.3 °C) 68 18 133/55 97 % 11/06/18 2000 98.9 °F (37.2 °C) 82 20 110/86 97 % 11/06/18 1830  89  111/64 96 % 11/06/18 1817  96  124/84 95 % Intake/Output Summary (Last 24 hours) at 11/7/2018 1810 Last data filed at 11/7/2018 0625 Gross per 24 hour Intake 4746.83 ml Output 3025 ml Net 1721.83 ml PHYSICAL EXAM: 
General: WD, WN. Alert, cooperative, no acute distress   
EENT:  EOMI. Anicteric sclerae. MMM Resp:  CTA bilaterally, no wheezing or rales. No accessory muscle use CV:  Regular  rhythm,  No edema GI:  Soft, Non distended, Non tender.  +Bowel sounds Neurologic:  Alert and oriented X 3, normal speech, Psych:   Good insight. Not anxious nor agitated Skin:  No rashes. No jaundice Reviewed most current lab test results and cultures  YES Reviewed most current radiology test results   YES Review and summation of old records today    NO Reviewed patient's current orders and MAR    YES 
PMH/SH reviewed - no change compared to H&P 
________________________________________________________________________ Care Plan discussed with: 
  Comments Patient y Family RN y   
Care Manager Consultant Multidiciplinary team rounds were held today with , nursing, pharmacist and clinical coordinator. Patient's plan of care was discussed; medications were reviewed and discharge planning was addressed. ________________________________________________________________________ Total NON critical care TIME:  25   Minutes Total CRITICAL CARE TIME Spent:   Minutes non procedure based Comments >50% of visit spent in counseling and coordination of care    
________________________________________________________________________ Tony Gregg MD  
 
 Procedures: see electronic medical records for all procedures/Xrays and details which were not copied into this note but were reviewed prior to creation of Plan. LABS: 
I reviewed today's most current labs and imaging studies. Pertinent labs include: 
Recent Labs 11/07/18 0247 11/06/18 
1338 WBC 7.7 11.2* HGB 12.4 15.9 HCT 35.8* 44.9  416* Recent Labs 11/07/18 0247 11/06/18 
2153 11/06/18 
1338 * 129* 125* K 3.0* 3.0* 2.8*  
CL 93* 91* 82* CO2 28 31 33*  145* 208* BUN 14 16 18 CREA 0.97 1.17 1.31* CA 8.2* 8.4* 9.3 MG 2.0 2.0  --   
PHOS 3.6 3.0  --   
ALB  --   --  3.6 TBILI  --   --  0.9 SGOT  --   --  17 ALT  --   --  32 Signed: Ciara Arceo MD

## 2018-11-08 VITALS
SYSTOLIC BLOOD PRESSURE: 133 MMHG | BODY MASS INDEX: 29.12 KG/M2 | WEIGHT: 208 LBS | DIASTOLIC BLOOD PRESSURE: 69 MMHG | HEART RATE: 72 BPM | RESPIRATION RATE: 16 BRPM | HEIGHT: 71 IN | OXYGEN SATURATION: 98 % | TEMPERATURE: 98.5 F

## 2018-11-08 LAB
ANION GAP SERPL CALC-SCNC: 10 MMOL/L (ref 5–15)
BUN SERPL-MCNC: 12 MG/DL (ref 6–20)
BUN/CREAT SERPL: 12 (ref 12–20)
CALCIUM SERPL-MCNC: 8.3 MG/DL (ref 8.5–10.1)
CHLORIDE SERPL-SCNC: 96 MMOL/L (ref 97–108)
CO2 SERPL-SCNC: 25 MMOL/L (ref 21–32)
CREAT SERPL-MCNC: 0.97 MG/DL (ref 0.7–1.3)
GLUCOSE BLD STRIP.AUTO-MCNC: 112 MG/DL (ref 65–100)
GLUCOSE BLD STRIP.AUTO-MCNC: 121 MG/DL (ref 65–100)
GLUCOSE SERPL-MCNC: 123 MG/DL (ref 65–100)
MAGNESIUM SERPL-MCNC: 1.9 MG/DL (ref 1.6–2.4)
PHOSPHATE SERPL-MCNC: 2.7 MG/DL (ref 2.6–4.7)
POTASSIUM SERPL-SCNC: 3.7 MMOL/L (ref 3.5–5.1)
SERVICE CMNT-IMP: ABNORMAL
SERVICE CMNT-IMP: ABNORMAL
SODIUM SERPL-SCNC: 131 MMOL/L (ref 136–145)

## 2018-11-08 PROCEDURE — 74011250637 HC RX REV CODE- 250/637: Performed by: INTERNAL MEDICINE

## 2018-11-08 PROCEDURE — 74011250636 HC RX REV CODE- 250/636: Performed by: INTERNAL MEDICINE

## 2018-11-08 PROCEDURE — 36415 COLL VENOUS BLD VENIPUNCTURE: CPT

## 2018-11-08 PROCEDURE — 74011250636 HC RX REV CODE- 250/636: Performed by: HOSPITALIST

## 2018-11-08 PROCEDURE — 80048 BASIC METABOLIC PNL TOTAL CA: CPT

## 2018-11-08 PROCEDURE — 83735 ASSAY OF MAGNESIUM: CPT

## 2018-11-08 PROCEDURE — 82962 GLUCOSE BLOOD TEST: CPT

## 2018-11-08 PROCEDURE — 84100 ASSAY OF PHOSPHORUS: CPT

## 2018-11-08 RX ORDER — DILTIAZEM HYDROCHLORIDE 180 MG/1
180 CAPSULE, COATED, EXTENDED RELEASE ORAL DAILY
Qty: 30 CAP | Refills: 1 | Status: SHIPPED | OUTPATIENT
Start: 2018-11-08

## 2018-11-08 RX ADMIN — DILTIAZEM HYDROCHLORIDE 180 MG: 180 CAPSULE, COATED, EXTENDED RELEASE ORAL at 09:27

## 2018-11-08 RX ADMIN — ASPIRIN 81 MG CHEWABLE TABLET 81 MG: 81 TABLET CHEWABLE at 09:26

## 2018-11-08 RX ADMIN — SODIUM CHLORIDE AND POTASSIUM CHLORIDE: 9; 1.49 INJECTION, SOLUTION INTRAVENOUS at 06:57

## 2018-11-08 RX ADMIN — ENOXAPARIN SODIUM 40 MG: 40 INJECTION, SOLUTION INTRAVENOUS; SUBCUTANEOUS at 09:27

## 2018-11-08 RX ADMIN — VITAMIN D, TAB 1000IU (100/BT) 1000 UNITS: 25 TAB at 09:27

## 2018-11-08 RX ADMIN — MULTIPLE VITAMINS W/ MINERALS TAB 1 TABLET: TAB at 09:27

## 2018-11-08 RX ADMIN — Medication 10 ML: at 05:20

## 2018-11-08 RX ADMIN — ATORVASTATIN CALCIUM 10 MG: 10 TABLET, FILM COATED ORAL at 09:27

## 2018-11-08 NOTE — DISCHARGE SUMMARY
Hospitalist Discharge Summary     Patient ID:  Richie Cooley  [id number]  72 y.o.  1945    PCP on record: Wero Mulligan MD    Admit date: 11/6/2018  Discharge date and time: 11/8/2018      DISCHARGE DIAGNOSIS:    New-onset atrial fibrillation with RVR   HTN   Severe hypokalemia presumed due to recent gastroenteritis, resolved   Hyponatremia  DM type II controlled with diet with hyperglycemia  Hyperlipidemia  JEISON  Code Status: Full        CONSULTATIONS:  IP CONSULT TO CARDIOLOGY    Excerpted HPI from H&P of Glendy Mccartney MD:    Camila Heart is a 72 y.o.  male who presents with above. Pt says he has been feeling poorly since last week. He complains of a \"stomach flu\" in which he had severe abdominal pain for a few days. He denies diarrhea with this. He did have loss of appetite and was not eating well, but was drinking soup and water. He recovered from this and was feeling better until a couple of days ago. Then, he developed chest pain throughout his precordium radiating into his neck. No radiation to his arms. He has PIZANO which is unusual for him. He denies nausea or stomach pain currently. He does endorse lightheadedness and dizziness. No new edema or focal weakness. He finally became concerned enough to see his PCP today but was found to have tachycardia and sent to the ER.        ______________________________________________________________________  DISCHARGE SUMMARY/HOSPITAL COURSE:  for full details see H&P, daily progress notes, labs, consult notes.      New-onset atrial fibrillation with RVR / HTN   -spontaneously back to NSR , remain in NSR   BP 140s   -TSH 1.93   -ECHO: EF 60%, no valvular abnormalities   -cardiology help appreciated:   CHADS2 score of 1- cont ASA  Change cardizem ggt to cd 180mg daily  Holding micardis, resume on discharge  Would stop indapamide     Severe hypokalemia presumed due to recent gastroenteritis  Hyponatremia  -K back to normal. Na 131   -he was supplemented aggressively as needed      DM type II controlled with diet with hyperglycemia  -/120s   hba1c 7.0  -SS as needed  -continue with diet alone, re check hba1c in 4 months       Hyperlipidemia: con't statin  JEISON: says he lost weight and told he no longer needs CPAP but wife endorses snoring  - pt was advised to d/w PCP referral for repeat outpatient sleep testing     Code Status: Full  Surrogate Decision Mine Feliciano cell [phone]   DVT Prophylaxis: lovenox     Baseline: independent  Recommended Disposition: home today       _______________________________________________________________________  Patient seen and examined by me on discharge day. Pertinent Findings:  General:          WD, WN. Alert, cooperative, no acute distress    EENT:              EOMI. Anicteric sclerae. MMM  Resp:               CTA bilaterally, no wheezing or rales. No accessory muscle use  CV:                  Regular  rhythm,  No edema  GI:                   Soft, Non distended, Non tender.  +Bowel sounds  Neurologic:      Alert and oriented X 3, normal speech,   Psych:             Good insight. Not anxious nor agitated  Skin:                No rashes. No jaundice         _______________________________________________________________________  DISCHARGE MEDICATIONS:   Current Discharge Medication List      START taking these medications    Details   dilTIAZem CD (CARDIZEM CD) 180 mg ER capsule Take 1 Cap by mouth daily. Qty: 30 Cap, Refills: 1         CONTINUE these medications which have NOT CHANGED    Details   ibuprofen (MOTRIN) 800 mg tablet Take 800 mg by mouth every eight (8) hours as needed for Pain. cholecalciferol (VITAMIN D3) 1,000 unit tablet Take 1,000 Units by mouth daily. vit C/E/Zn/coppr/lutein/zeaxan (PRESERVISION AREDS-2 PO) Take 1 Tab by mouth daily. nortriptyline (PAMELOR) 10 mg capsule Take 20 mg by mouth nightly.       rosuvastatin (CRESTOR) 5 mg tablet Take 5 mg by mouth daily. telmisartan (MICARDIS) 40 mg tablet Take 40 mg by mouth nightly. aspirin 81 mg chewable tablet Take 81 mg by mouth daily. STOP taking these medications       indapamide (LOZOL) 2.5 mg tablet Comments:   Reason for Stopping:               My Recommended Diet, Activity, Wound Care, and follow-up labs are listed in the patient's Discharge Insturctions which I have personally completed and reviewed.     ______________________________________________________________________    Risk of deterioration: Low    Condition at Discharge:  Stable  ______________________________________________________________________    Disposition  Home with family, no needs  ______________________________________________________________________    Care Plan discussed with:   Patient, RN    ______________________________________________________________________    Code Status: Full Code  ______________________________________________________________________      Follow up with:   PCP : Maricel Juan MD  Follow-up Information     Follow up With Specialties Details Why Kathy Obrien MD Cardiology In 2 weeks  [address]  [address]  [facility] [address]  [phone]      Maricel Juan MD Family Practice  As needed [id number] [facility]  [phone]                Total time in minutes spent coordinating this discharge (includes going over instructions, follow-up, prescriptions, and preparing report for sign off to her PCP) :  < 30 minutes    Signed:  Stanley Fuentes MD

## 2018-11-08 NOTE — PROGRESS NOTES
Progress Note 
 
 
11/8/2018 8:59 AM 
NAME: Robert Galindo MRN:  [id number] Admit Diagnosis: Atrial fibrillation ([facility] [address] Assessment:   
  
1. Recent viral illness with fevers, abd pain, presents with hyponatremia, hypokalemia with afib with RVR 2. No hx of CAD 3. Echo 11/2018 nl EF 4. Prox afib spont back in sinus 5. HTN 
6. Dyslipidemia 7. Distant carotid doppler with moderate right disease 8. Hx of JEISON, no longer on CPAP after weight loss 9. Hx of colonic polyps 10. Asthma 11. , retired from [employer] system, active in [employer]:    
  
Recent viral illness with hyponatremia, hypokalemia. Symptomatic afib with RVR spont back in sinus CHADS2 score of 1 
  
TSH ok 
Echo ok 
  
1. Cont asa 2. Change cardizem ggt to cd 180mg daily 3. Holding micardis, resume on discharge 4. Would stop indapamide, replete K 
5. Cont statin Stable for discharge. Follow up with me in two weeks. 
  
   
  
 [x]        High complexity decision making was performed 
  
 
 
 
 
Subjective:  
 
Robert Galindo denies chest pain, dyspnea. Discussed with RN events overnight. Review of Systems: 
 
Symptom Y/N Comments  Symptom Y/N Comments Fever/Chills N   Chest Pain N Poor Appetite N   Edema N   
Cough N   Abdominal Pain N Sputum N   Joint Pain N   
SOB/PIZANO N   Pruritis/Rash N   
Nausea/vomit N   Tolerating PT/OT Y Diarrhea N   Tolerating Diet Y Constipation N   Other Could NOT obtain due to:   
 
Objective:  
  
Physical Exam: 
 
Last 24hrs VS reviewed since prior progress note. Most recent are: 
 
Visit Vitals /67 (BP Patient Position: At rest) Pulse 73 Temp 98.1 °F (36.7 °C) Resp 16 Ht 5' 10.5\" (1.791 m) Wt 94.3 kg (208 lb) SpO2 98% BMI 29.42 kg/m² Intake/Output Summary (Last 24 hours) at 11/8/2018 1035 Last data filed at 11/8/2018 9684 Gross per 24 hour Intake 3301.67 ml Output 3450 ml Net -148. 33 ml  
  
 
 General Appearance: Well developed, well nourished, alert & oriented x 3,  
 no acute distress. Ears/Nose/Mouth/Throat: Hearing grossly normal. 
Neck: Supple. Chest: Lungs clear to auscultation bilaterally. Cardiovascular: Regular rate and rhythm, S1S2 normal, no murmur. Abdomen: Soft, non-tender, bowel sounds are active. Extremities: No edema bilaterally. Skin: Warm and dry. PMH/SH reviewed - no change compared to H&P Data Review Telemetry: normal sinus rhythm Lab Data Personally Reviewed: 
 
Recent Labs 11/07/18 
0247 11/06/18 
1338 WBC 7.7 11.2* HGB 12.4 15.9 HCT 35.8* 44.9  416* No results for input(s): INR, PTP, APTT in the last 72 hours. No lab exists for component: INREXT, INREXT Recent Labs 11/08/18 
0205 11/07/18 
0247 11/06/18 
2153 * 131* 129*  
K 3.7 3.0* 3.0*  
CL 96* 93* 91* CO2 25 28 31 BUN 12 14 16 CREA 0.97 0.97 1.17  
* 100 145* CA 8.3* 8.2* 8.4* MG 1.9 2.0 2.0 Recent Labs 11/06/18 
1338 TROIQ <0.05 No results found for: CHOL, CHOLX, CHLST, CHOLV, HDL, LDL, LDLC, DLDLP, TGLX, TRIGL, TRIGP, CHHD, CHHDX Recent Labs 11/06/18 
1338 SGOT 17  
AP 84  
TP 8.0 ALB 3.6 GLOB 4.4* No results for input(s): PH, PCO2, PO2 in the last 72 hours. Medications Personally Reviewed: 
 
Current Facility-Administered Medications Medication Dose Route Frequency  dilTIAZem CD (CARDIZEM CD) capsule 180 mg  180 mg Oral DAILY  enoxaparin (LOVENOX) injection 40 mg  40 mg SubCUTAneous DAILY  aspirin chewable tablet 81 mg  81 mg Oral DAILY  cholecalciferol (VITAMIN D3) tablet 1,000 Units  1,000 Units Oral DAILY  nortriptyline (PAMELOR) capsule 20 mg  20 mg Oral QHS  atorvastatin (LIPITOR) tablet 10 mg  10 mg Oral DAILY  multivitamin, tx-iron-ca-min (THERA-M w/ IRON) tablet 1 Tab  1 Tab Oral DAILY  sodium chloride (NS) flush 5-10 mL  5-10 mL IntraVENous Q8H  
  sodium chloride (NS) flush 5-10 mL  5-10 mL IntraVENous PRN  
 acetaminophen (TYLENOL) tablet 650 mg  650 mg Oral Q4H PRN  
 ondansetron (ZOFRAN) injection 4 mg  4 mg IntraVENous Q4H PRN  
 bisacodyl (DULCOLAX) tablet 5 mg  5 mg Oral DAILY PRN  
 insulin lispro (HUMALOG) injection   SubCUTAneous AC&HS  
 glucose chewable tablet 16 g  4 Tab Oral PRN  
 dextrose (D50W) injection syrg 12.5-25 g  12.5-25 g IntraVENous PRN  
 glucagon (GLUCAGEN) injection 1 mg  1 mg IntraMUSCular PRN  
 0.9% sodium chloride with KCl 20 mEq/L infusion   IntraVENous CONTINUOUS Aditya Martinez MD

## 2018-11-08 NOTE — PROGRESS NOTES
PCP HUMBERTO appt scheduled with Dr. Edu Calloway on 11/20/2018 at 11:00am. Appt added to AVS. Nanette Jj CM Specialist

## 2018-11-08 NOTE — PROGRESS NOTES
Hospitalist Progress Note NAME: Kayley Garibay :  1945 MRN:  [id number] Assessment / Plan: 
New-onset atrial fibrillation with RVR / HTN  
-spontaneously back to NSR , remain in NSR  
BP 140s -TSH 1.93  
-ECHO: EF 60%, no valvular abnormalities  
-cardiology help appreciated: CHADS2 score of 1- cont ASA Change cardizem ggt to cd 180mg daily Holding micardis, resume on discharge Would stop indapamide Severe hypokalemia presumed due to recent gastroenteritis Hyponatremia 
-K back to normal. Na 131 -he was supplemented aggressively as needed DM type II controlled with diet with hyperglycemia 
-/120s  
hba1c 7.0 
-SS as needed 
-continue with diet alone, re check hba1c in 4 months Hyperlipidemia: con't statin JEISON: says he lost weight and told he no longer needs CPAP but wife endorses snoring 
- consider referral for repeat outpatient sleep testing 
  
Code Status: Full Surrogate Decision Maker: wife Rose Speak cell [phone] DVT Prophylaxis: lovenox 
  
Baseline: independent Recommended Disposition: Home w/Family once cleared by cardiology Subjective: Chief Complaint / Reason for Physician Visit: Afib / DM Doing well OOB to chair Discussed with RN events overnight. Review of Systems: 
Symptom Y/N Comments  Symptom Y/N Comments Fever/Chills n   Chest Pain n   
Poor Appetite    Edema Cough    Abdominal Pain n   
Sputum    Joint Pain SOB/PIZANO n   Pruritis/Rash Nausea/vomit    Tolerating PT/OT Diarrhea    Tolerating Diet Constipation    Other Could NOT obtain due to:   
 
Objective: VITALS:  
Last 24hrs VS reviewed since prior progress note. Most recent are: 
Patient Vitals for the past 24 hrs: 
 Temp Pulse Resp BP SpO2  
18 0723 98.1 °F (36.7 °C) 73 16 142/67 98 % 18 0319 98.7 °F (37.1 °C) 69 16 148/60 96 % 18 2321 99.1 °F (37.3 °C) 89 17 103/71 98 % 18 1908 98.3 °F (36.8 °C) 78 18 151/59 97 % 11/07/18 1906  78  151/59   
11/07/18 1515 98.7 °F (37.1 °C) 71 16 112/86 97 % 11/07/18 1330 99 °F (37.2 °C) 71 18 129/57 97 % 11/07/18 1107 98.1 °F (36.7 °C) 71 18 132/55 97 % Intake/Output Summary (Last 24 hours) at 11/8/2018 0900 Last data filed at 11/8/2018 2519 Gross per 24 hour Intake 3541.67 ml Output 3450 ml Net 91.67 ml PHYSICAL EXAM: 
General: WD, WN. Alert, cooperative, no acute distress   
EENT:  EOMI. Anicteric sclerae. MMM Resp:  CTA bilaterally, no wheezing or rales. No accessory muscle use CV:  Regular  rhythm,  No edema GI:  Soft, Non distended, Non tender.  +Bowel sounds Neurologic:  Alert and oriented X 3, normal speech, Psych:   Good insight. Not anxious nor agitated Skin:  No rashes. No jaundice Reviewed most current lab test results and cultures  YES Reviewed most current radiology test results   YES Review and summation of old records today    NO Reviewed patient's current orders and MAR    YES 
PMH/ reviewed - no change compared to H&P 
________________________________________________________________________ Care Plan discussed with: 
  Comments Patient y Family RN y   
Care Manager Consultant Multidiciplinary team rounds were held today with , nursing, pharmacist and clinical coordinator. Patient's plan of care was discussed; medications were reviewed and discharge planning was addressed. ________________________________________________________________________ Total NON critical care TIME: 30  Minutes Total CRITICAL CARE TIME Spent:   Minutes non procedure based Comments >50% of visit spent in counseling and coordination of care    
________________________________________________________________________ Alton Anderson MD  
 
Procedures: see electronic medical records for all procedures/Xrays and details which were not copied into this note but were reviewed prior to creation of Plan. LABS: 
I reviewed today's most current labs and imaging studies. Pertinent labs include: 
Recent Labs 11/07/18 0247 11/06/18 
1338 WBC 7.7 11.2* HGB 12.4 15.9 HCT 35.8* 44.9  416* Recent Labs 11/08/18 
0205 11/07/18 0247 11/06/18 
2153 11/06/18 
1338 * 131* 129* 125* K 3.7 3.0* 3.0* 2.8*  
CL 96* 93* 91* 82* CO2 25 28 31 33* * 100 145* 208* BUN 12 14 16 18 CREA 0.97 0.97 1.17 1.31* CA 8.3* 8.2* 8.4* 9.3 MG 1.9 2.0 2.0  --   
PHOS 2.7 3.6 3.0  --   
ALB  --   --   --  3.6 TBILI  --   --   --  0.9 SGOT  --   --   --  17 ALT  --   --   --  32 Signed: Ciara Arceo MD

## 2018-11-08 NOTE — ROUTINE PROCESS
Discharge paperwork reviewed with pt and wife, including new medications and follow up appointments. Prescription placed in folder along with discharge paperwork. PIVx2 removed, pressure held, site dressed. Pt escorted to [facility] via [facility] with support tech. No acute events.

## 2018-11-08 NOTE — DISCHARGE INSTRUCTIONS
Patient Discharge Instructions    Nona Harada / [id number] : 1945    Admitted 2018 Discharged: 2018         DISCHARGE DIAGNOSIS:       Atrial fibrillation    DM - your hba1c was 7.0     Obstructive sleep apnea - consider new sleep study testing       Take Home Medications         General drug facts     If you have a very bad allergy, wear an allergy ID at all times. It is important that you take the medication exactly as they are prescribed. Keep your medication in the bottles provided by the pharmacist.  Keep a list of all your drugs (prescription, natural products, vitamins, OTC) with you. Give this list to your doctor. Do not take other medications without consulting your doctor. Do not share your drugs with others and do not take anyone else's drugs. Keep all drugs out of the reach of children and pets. Most drugs may be thrown away in household trash after mixing with coffee grounds or valery litter and sealing in a plastic bag. Keep a list Call your doctor for help with any side effects. If in the [place] you may also call the FDA at 3-732-YFH-5418    Talk with the doctor before starting any new drug, including OTC, natural products, or vitamins. What to do at Home    1. Recommended diet:daibetic     2. Recommended activity: Activity as tolerated    3. If you experience any of the following symptoms then please call your primary care physician or return to the emergency room if you cannot get hold of your doctor: chest pain / weakness     4. Bring these papers with you to your follow up appointments.  The papers will help your doctors be sure to continue the care plan from the hospital.      Follow-up with:   PCP: Manuela Ogden MD  Follow-up Information     Follow up With Specialties Details Why Nitin Iraheta MD Cardiology In 2 weeks  [address] [place] [address] [address] [facility]      Manuela Ogden MD Family Practice  As needed [address] [place] [address]  [phone]             Please call for your own appointment        Information obtained by :  I understand that if any problems occur once I am at home I am to contact my physician. I understand and acknowledge receipt of the instructions indicated above.                                                                                                                                            Physician's or R.N.'s Signature                                                                  Date/Time                                                                                                                                              Patient or Representative Signature                                                          Date/Time

## 2019-11-01 ENCOUNTER — ANESTHESIA (OUTPATIENT)
Dept: ENDOSCOPY | Age: 74
End: 2019-11-01
Payer: MEDICARE

## 2019-11-01 ENCOUNTER — HOSPITAL ENCOUNTER (OUTPATIENT)
Age: 74
Setting detail: OUTPATIENT SURGERY
Discharge: HOME | End: 2019-11-01
Attending: INTERNAL MEDICINE | Admitting: INTERNAL MEDICINE
Payer: MEDICARE

## 2019-11-01 ENCOUNTER — ANESTHESIA EVENT (OUTPATIENT)
Dept: ENDOSCOPY | Age: 74
End: 2019-11-01
Payer: MEDICARE

## 2019-11-01 VITALS
TEMPERATURE: 98 F | WEIGHT: 211 LBS | DIASTOLIC BLOOD PRESSURE: 70 MMHG | SYSTOLIC BLOOD PRESSURE: 137 MMHG | OXYGEN SATURATION: 100 % | RESPIRATION RATE: 22 BRPM | BODY MASS INDEX: 30.21 KG/M2 | HEART RATE: 81 BPM | HEIGHT: 70 IN

## 2019-11-01 PROCEDURE — 74011250636 HC RX REV CODE- 250/636: Performed by: INTERNAL MEDICINE

## 2019-11-01 PROCEDURE — 77030013992 HC SNR POLYP ENDOSC BSC -B: Performed by: INTERNAL MEDICINE

## 2019-11-01 PROCEDURE — 74011250636 HC RX REV CODE- 250/636: Performed by: ANESTHESIOLOGY

## 2019-11-01 PROCEDURE — 88305 TISSUE EXAM BY PATHOLOGIST: CPT

## 2019-11-01 PROCEDURE — 76040000007: Performed by: INTERNAL MEDICINE

## 2019-11-01 PROCEDURE — 74011000250 HC RX REV CODE- 250: Performed by: ANESTHESIOLOGY

## 2019-11-01 PROCEDURE — 76060000032 HC ANESTHESIA 0.5 TO 1 HR: Performed by: INTERNAL MEDICINE

## 2019-11-01 RX ORDER — SODIUM CHLORIDE 0.9 % (FLUSH) 0.9 %
5-40 SYRINGE (ML) INJECTION EVERY 8 HOURS
Status: DISCONTINUED | OUTPATIENT
Start: 2019-11-01 | End: 2019-11-01 | Stop reason: HOSPADM

## 2019-11-01 RX ORDER — MIDAZOLAM HYDROCHLORIDE 1 MG/ML
.25-5 INJECTION, SOLUTION INTRAMUSCULAR; INTRAVENOUS
Status: DISCONTINUED | OUTPATIENT
Start: 2019-11-01 | End: 2019-11-01 | Stop reason: HOSPADM

## 2019-11-01 RX ORDER — ATROPINE SULFATE 0.1 MG/ML
0.5 INJECTION INTRAVENOUS
Status: DISCONTINUED | OUTPATIENT
Start: 2019-11-01 | End: 2019-11-01 | Stop reason: HOSPADM

## 2019-11-01 RX ORDER — NALOXONE HYDROCHLORIDE 0.4 MG/ML
0.4 INJECTION, SOLUTION INTRAMUSCULAR; INTRAVENOUS; SUBCUTANEOUS
Status: DISCONTINUED | OUTPATIENT
Start: 2019-11-01 | End: 2019-11-01 | Stop reason: HOSPADM

## 2019-11-01 RX ORDER — DEXTROMETHORPHAN/PSEUDOEPHED 2.5-7.5/.8
1.2 DROPS ORAL
Status: DISCONTINUED | OUTPATIENT
Start: 2019-11-01 | End: 2019-11-01 | Stop reason: HOSPADM

## 2019-11-01 RX ORDER — SODIUM CHLORIDE 9 MG/ML
75 INJECTION, SOLUTION INTRAVENOUS CONTINUOUS
Status: DISCONTINUED | OUTPATIENT
Start: 2019-11-01 | End: 2019-11-01 | Stop reason: HOSPADM

## 2019-11-01 RX ORDER — SODIUM CHLORIDE 0.9 % (FLUSH) 0.9 %
5-40 SYRINGE (ML) INJECTION AS NEEDED
Status: DISCONTINUED | OUTPATIENT
Start: 2019-11-01 | End: 2019-11-01 | Stop reason: HOSPADM

## 2019-11-01 RX ORDER — EPINEPHRINE 0.1 MG/ML
1 INJECTION INTRACARDIAC; INTRAVENOUS
Status: DISCONTINUED | OUTPATIENT
Start: 2019-11-01 | End: 2019-11-01 | Stop reason: HOSPADM

## 2019-11-01 RX ORDER — PROPOFOL 10 MG/ML
INJECTION, EMULSION INTRAVENOUS AS NEEDED
Status: DISCONTINUED | OUTPATIENT
Start: 2019-11-01 | End: 2019-11-01 | Stop reason: HOSPADM

## 2019-11-01 RX ORDER — FLUMAZENIL 0.1 MG/ML
0.2 INJECTION INTRAVENOUS
Status: DISCONTINUED | OUTPATIENT
Start: 2019-11-01 | End: 2019-11-01 | Stop reason: HOSPADM

## 2019-11-01 RX ORDER — LIDOCAINE HYDROCHLORIDE 20 MG/ML
INJECTION, SOLUTION EPIDURAL; INFILTRATION; INTRACAUDAL; PERINEURAL AS NEEDED
Status: DISCONTINUED | OUTPATIENT
Start: 2019-11-01 | End: 2019-11-01 | Stop reason: HOSPADM

## 2019-11-01 RX ADMIN — PROPOFOL 280 MG: 10 INJECTION, EMULSION INTRAVENOUS at 15:47

## 2019-11-01 RX ADMIN — SODIUM CHLORIDE 75 ML/HR: 900 INJECTION, SOLUTION INTRAVENOUS at 15:10

## 2019-11-01 RX ADMIN — LIDOCAINE HYDROCHLORIDE 40 MG: 20 INJECTION, SOLUTION EPIDURAL; INFILTRATION; INTRACAUDAL; PERINEURAL at 15:26

## 2019-11-01 NOTE — PERIOP NOTES
Anesthesia reports 280mg Propofol, 40mg Lidocaine and 450mL NS given during procedure. Received report from anesthesia staff on vital signs and status of patient. Endoscope was pre-cleaned at the bedside immediately following procedure by CAROL Najera RN.

## 2019-11-01 NOTE — ANESTHESIA PREPROCEDURE EVALUATION
Relevant Problems   No relevant active problems       Anesthetic History   No history of anesthetic complications            Review of Systems / Medical History  Patient summary reviewed, nursing notes reviewed and pertinent labs reviewed    Pulmonary        Sleep apnea: No treatment    Asthma        Neuro/Psych   Within defined limits           Cardiovascular    Hypertension        Dysrhythmias : atrial fibrillation  Hyperlipidemia    Exercise tolerance: >4 METS     GI/Hepatic/Renal  Within defined limits              Endo/Other    Diabetes    Arthritis     Other Findings              Physical Exam    Airway  Mallampati: II  TM Distance: 4 - 6 cm  Neck ROM: normal range of motion   Mouth opening: Normal     Cardiovascular  Regular rate and rhythm,  S1 and S2 normal,  no murmur, click, rub, or gallop             Dental  No notable dental hx       Pulmonary  Breath sounds clear to auscultation               Abdominal  GI exam deferred       Other Findings            Anesthetic Plan    ASA: 2  Anesthesia type: total IV anesthesia and MAC          Induction: Intravenous  Anesthetic plan and risks discussed with: Patient

## 2019-11-01 NOTE — DISCHARGE INSTRUCTIONS
[place] Office: [phone]    María Count includes the [facility]  [id number]  1945    EGD/COLONOSCOPY DISCHARGE INSTRUCTIONS  Discomfort:  Sore throat- throat lozenges or warm salt water gargle  redness at IV site- apply warm compress to area; if redness or soreness persist- contact your physician  Gaseous discomfort- walking, belching will help relieve any discomfort  You may not operate a vehicle for 12 hours  You may not engage in an occupation involving machinery or appliances for rest of today. You may not drink alcoholic beverages for at least 12 hours  Avoid making any critical decisions for at least 24 hour  DIET  You may resume your regular diet - however -  remember your colon is empty and a heavy meal will produce gas. Avoid these foods:  fried / greasy foods, excessive carbonated drinks or too much caffeine  MEDICATIONS   Regarding Aspirin or Nonsteroidal medications specifically, please see below. ACTIVITY  You may resume your normal daily activities. Spend the remainder of the day resting -  avoid any strenuous activity. CALL M.D. ANY SIGN OF   Increasing pain, nausea, vomiting  Abdominal distension (swelling)  New increased bleeding (oral or rectal)  Fever (chills)  Pain in chest area  Bloody discharge from nose or mouth  Shortness of breath    You may not take any Advil, Aspirin, Ibuprofen, Motrin, Aleve, or Goodys for 7 days, ONLY  Tylenol as needed for pain. Follow-up Instructions:   Call  Cristofer Dumont MD for any questions or concerns  Results of procedure / biopsy in 7 days   Telephone # [phone]      Follow-up Information    None

## 2019-11-01 NOTE — PROCEDURES
Colonoscopy Procedure Note    Brii Organ  1945  [id number]    Indications:  Please see below. Pre-operative Diagnosis: PERSONAL HX OF COLONIC POLYPS    Post-operative Diagnosis: Diverticulosis, Ascending colon polyp, Internal Hemorrhoids      : Alison Rojas. Kashmir Perdomo MD    Referring Provider: Cliffton Buerger., MD    Sedation:  MAC anesthesia Propofol        Procedure Details:    After detailed informed consent was obtained with all risks and benefits of procedure explained and preoperative exam completed, the patient was taken to the endoscopy suite and placed in the left lateral decubitus position. Upon sequential sedation as per above, a digital rectal exam was performed  And was normal.  The Olympus videocolonoscope  was inserted in the rectum and carefully advanced to the cecum, which was identified by the ileocecal valve and appendiceal orifice. The quality of preparation was unsatisfactory for flat lesions/polyps < 5 mm. The colonoscope was slowly withdrawn with careful evaluation between folds. Retroflexion in the rectum was performed. Findings:     · Colon prep is fair with grainy material in cecum,trasverse and left colon. · A 7 mm sessile ascending colon polyp was removed with a cold snare. · Sigmoid colon moderate diverticulosis. · Medium sized internal hemorrhoids      Therapies:  1 complete polypectomy was performed using cold snare  and the polyp was retrieved    Specimen:  Specimen was collected as described above and sent to pathology. Complications: None were encountered during the procedure. EBL:  None. Recommendations:     -Await pathology. -Repeat colonoscopy in 2 years(polyp,prep, hx of colon polyps)    Naturally, for new bleeding, unexplained weight loss,change in bowel habits and anemia, an earlier colonoscopy should be considered. Cristofer Perdomo MD  11/1/2019  3:48 PM

## 2019-11-01 NOTE — ANESTHESIA POSTPROCEDURE EVALUATION
Procedure(s):  COLONOSCOPY  ENDOSCOPIC POLYPECTOMY. total IV anesthesia    Anesthesia Post Evaluation        Patient location during evaluation: PACU  Note status: Adequate. Level of consciousness: responsive to verbal stimuli and sleepy but conscious  Pain management: satisfactory to patient  Airway patency: patent  Anesthetic complications: no  Cardiovascular status: acceptable  Respiratory status: acceptable  Hydration status: acceptable  Comments: +Post-Anesthesia Evaluation and Assessment    Patient: Ruben Xie MRN: [id number]  SSN: [id number]   YOB: 1945  Age: 69 y.o. Sex: male      Cardiovascular Function/Vital Signs    /64   Pulse 82   Temp 36.4 °C (97.6 °F)   Resp 22   Ht 5' 10\" (1.778 m)   Wt 95.7 kg (211 lb)   SpO2 97%   BMI 30.28 kg/m²     Patient is status post Procedure(s):  COLONOSCOPY  ENDOSCOPIC POLYPECTOMY. Nausea/Vomiting: Controlled. Postoperative hydration reviewed and adequate. Pain:  Pain Scale 1: Numeric (0 - 10) (11/01/19 1556)  Pain Intensity 1: 0 (11/01/19 1556)   Managed. Neurological Status: At baseline. Mental Status and Level of Consciousness: Arousable. Pulmonary Status:   O2 Device: Room air (11/01/19 1556)   Adequate oxygenation and airway patent. Complications related to anesthesia: None    Post-anesthesia assessment completed. No concerns. Signed By: Romina Dean DO    11/1/2019  Post anesthesia nausea and vomiting:  controlled      No vitals data found for the desired time range.

## 2019-11-01 NOTE — ROUTINE PROCESS
Beck Siddiqui 1945 
[id number] Situation: 
Verbal report received from: Karen Salcedo RN Procedure: Procedure(s): 
COLONOSCOPY 
ENDOSCOPIC POLYPECTOMY Background: 
 
Preoperative diagnosis: PERSONAL HX OF COLONIC POLYPS Postoperative diagnosis: Diverticulosis, Polyp, Internal Hemorrhoids :  Dr. Kristofer Vaca 
Assistant(s): Endoscopy Technician-1: Shreyas Khoury Endoscopy RN-1: Raghu Jolley Specimens:  
ID Type Source Tests Collected by Time Destination 1 : Ascending Colon Polyp Preservative Colon, Ascending  Aysha Mcnulty MD 11/1/2019 1544 Pathology H. Pylori  no Assessment: Anesthesia gave intra-procedure sedation and medications, see anesthesia flow sheet yes Intravenous fluids: NS@ Caden Cornea Vital signs stable Abdominal assessment: round and soft Recommendation: 
Discharge patient per MD order Family or Friend- wife- Job Reginaldo Permission to share finding with family or friend yes

## 2019-11-01 NOTE — H&P
Pre-endoscopy H and P    The patient was seen and examined in the room/pre-op holding area. The airway was assessed and documented. The problem list, past medical history, and medications were reviewed.      Patient Active Problem List   Diagnosis Code    Atrial fibrillation (HCC) I48.91     Social History     Socioeconomic History    Marital status:      Spouse name: Not on file    Number of children: Not on file    Years of education: Not on file    Highest education level: Not on file   Occupational History    Not on file   Social Needs    Financial resource strain: Not on file    Food insecurity:     Worry: Not on file     Inability: Not on file    Transportation needs:     Medical: Not on file     Non-medical: Not on file   Tobacco Use    Smoking status: Never Smoker    Smokeless tobacco: Never Used   Substance and Sexual Activity    Alcohol use: No    Drug use: Not on file    Sexual activity: Not on file   Lifestyle    Physical activity:     Days per week: Not on file     Minutes per session: Not on file    Stress: Not on file   Relationships    Social connections:     Talks on phone: Not on file     Gets together: Not on file     Attends Ca[employer] service: Not on file     Active member of club or organization: Not on file     Attends meetings of clubs or organizations: Not on file     Relationship status: Not on file    Intimate partner violence:     Fear of current or ex partner: Not on file     Emotionally abused: Not on file     Physically abused: Not on file     Forced sexual activity: Not on file   Other Topics Concern    Not on file   Social History Narrative    Not on file     Past Medical History:   Diagnosis Date    Arrhythmia     a fib    Arthritis     Asthma     as a child    Diabetes ([facility] [address]     diet controlled    Hyperlipidemia     Hypertension     JEISON (obstructive sleep apnea)     lost weight, no longer on CPAP         Prior to Admission Medications Prescriptions Last Dose Informant Patient Reported? Taking?   aspirin 81 mg chewable tablet 10/30/2019 at Unknown time Self Yes Yes   Sig: Take 81 mg by mouth daily. cholecalciferol (VITAMIN D3) 1,000 unit tablet 10/31/2019 at Unknown time Self Yes Yes   Sig: Take 1,000 Units by mouth daily. dilTIAZem CD (CARDIZEM CD) 180 mg ER capsule 11/1/2019 at Unknown time  No Yes   Sig: Take 1 Cap by mouth daily. ibuprofen (MOTRIN) 800 mg tablet 10/30/2019 Self Yes Yes   Sig: Take 800 mg by mouth every eight (8) hours as needed for Pain. nortriptyline (PAMELOR) 10 mg capsule 10/31/2019 at Unknown time Self Yes Yes   Sig: Take 20 mg by mouth nightly. rosuvastatin (CRESTOR) 5 mg tablet 10/31/2019 at Unknown time Self Yes Yes   Sig: Take 5 mg by mouth daily. telmisartan (MICARDIS) 40 mg tablet 10/31/2019 at Unknown time Self Yes Yes   Sig: Take 40 mg by mouth nightly. vit C/E/Zn/coppr/lutein/zeaxan (PRESERVISION AREDS-2 PO) 10/31/2019 at Unknown time Self Yes Yes   Sig: Take 1 Tab by mouth daily. Facility-Administered Medications: None           The review of systems is:  Negative  for shortness of breath or chest pain      The heart, lungs, and mental status were satisfactory for the administration of deep sedation and for the procedure. I discussed with the patient the objectives, risks, consequences and alternatives to the procedure.       Nelly Nicole MD  11/1/2019  3:24 PM

## 2022-03-20 PROBLEM — I48.91 ATRIAL FIBRILLATION (HCC): Status: ACTIVE | Noted: 2018-11-06

## 2023-06-27 RX ORDER — METOPROLOL SUCCINATE 25 MG/1
25 TABLET, EXTENDED RELEASE ORAL DAILY
COMMUNITY

## 2023-06-27 RX ORDER — TELMISARTAN 80 MG/1
80 TABLET ORAL DAILY
COMMUNITY

## 2023-06-27 RX ORDER — DILTIAZEM HYDROCHLORIDE 180 MG/1
180 CAPSULE, EXTENDED RELEASE ORAL DAILY
COMMUNITY

## 2023-06-27 RX ORDER — NORTRIPTYLINE HYDROCHLORIDE 10 MG/1
20 CAPSULE ORAL NIGHTLY
COMMUNITY

## 2023-06-27 RX ORDER — ROSUVASTATIN CALCIUM 5 MG/1
5 TABLET, COATED ORAL DAILY
COMMUNITY

## 2023-06-28 ENCOUNTER — ANESTHESIA (OUTPATIENT)
Facility: HOSPITAL | Age: 78
End: 2023-06-28
Payer: MEDICARE

## 2023-06-28 ENCOUNTER — ANESTHESIA EVENT (OUTPATIENT)
Facility: HOSPITAL | Age: 78
End: 2023-06-28
Payer: MEDICARE

## 2023-06-28 ENCOUNTER — HOSPITAL ENCOUNTER (OUTPATIENT)
Facility: HOSPITAL | Age: 78
Setting detail: OUTPATIENT SURGERY
Discharge: HOME | End: 2023-06-28
Attending: INTERNAL MEDICINE | Admitting: INTERNAL MEDICINE
Payer: MEDICARE

## 2023-06-28 VITALS
HEART RATE: 82 BPM | WEIGHT: 199.9 LBS | TEMPERATURE: 98 F | RESPIRATION RATE: 20 BRPM | BODY MASS INDEX: 28.62 KG/M2 | DIASTOLIC BLOOD PRESSURE: 76 MMHG | OXYGEN SATURATION: 99 % | HEIGHT: 70 IN | SYSTOLIC BLOOD PRESSURE: 159 MMHG

## 2023-06-28 PROCEDURE — 2580000003 HC RX 258: Performed by: INTERNAL MEDICINE

## 2023-06-28 PROCEDURE — 3600007502: Performed by: INTERNAL MEDICINE

## 2023-06-28 PROCEDURE — 3700000001 HC ADD 15 MINUTES (ANESTHESIA): Performed by: INTERNAL MEDICINE

## 2023-06-28 PROCEDURE — 2500000003 HC RX 250 WO HCPCS: Performed by: NURSE ANESTHETIST, CERTIFIED REGISTERED

## 2023-06-28 PROCEDURE — 6360000002 HC RX W HCPCS: Performed by: NURSE ANESTHETIST, CERTIFIED REGISTERED

## 2023-06-28 PROCEDURE — 2709999900 HC NON-CHARGEABLE SUPPLY: Performed by: INTERNAL MEDICINE

## 2023-06-28 PROCEDURE — 3700000000 HC ANESTHESIA ATTENDED CARE: Performed by: INTERNAL MEDICINE

## 2023-06-28 PROCEDURE — 3600007512: Performed by: INTERNAL MEDICINE

## 2023-06-28 PROCEDURE — 7100000010 HC PHASE II RECOVERY - FIRST 15 MIN: Performed by: INTERNAL MEDICINE

## 2023-06-28 PROCEDURE — 88305 TISSUE EXAM BY PATHOLOGIST: CPT

## 2023-06-28 PROCEDURE — 7100000011 HC PHASE II RECOVERY - ADDTL 15 MIN: Performed by: INTERNAL MEDICINE

## 2023-06-28 RX ORDER — SODIUM CHLORIDE 9 MG/ML
25 INJECTION, SOLUTION INTRAVENOUS PRN
Status: DISCONTINUED | OUTPATIENT
Start: 2023-06-28 | End: 2023-06-28 | Stop reason: HOSPADM

## 2023-06-28 RX ORDER — LIDOCAINE HYDROCHLORIDE 20 MG/ML
INJECTION, SOLUTION EPIDURAL; INFILTRATION; INTRACAUDAL; PERINEURAL PRN
Status: DISCONTINUED | OUTPATIENT
Start: 2023-06-28 | End: 2023-06-28 | Stop reason: SDUPTHER

## 2023-06-28 RX ORDER — SODIUM CHLORIDE 0.9 % (FLUSH) 0.9 %
5-40 SYRINGE (ML) INJECTION PRN
Status: DISCONTINUED | OUTPATIENT
Start: 2023-06-28 | End: 2023-06-28 | Stop reason: HOSPADM

## 2023-06-28 RX ORDER — SODIUM CHLORIDE 0.9 % (FLUSH) 0.9 %
5-40 SYRINGE (ML) INJECTION EVERY 12 HOURS SCHEDULED
Status: DISCONTINUED | OUTPATIENT
Start: 2023-06-28 | End: 2023-06-28 | Stop reason: HOSPADM

## 2023-06-28 RX ADMIN — PROPOFOL 80 MG: 10 INJECTION, EMULSION INTRAVENOUS at 11:04

## 2023-06-28 RX ADMIN — SODIUM CHLORIDE: 9 INJECTION, SOLUTION INTRAVENOUS at 10:51

## 2023-06-28 RX ADMIN — SODIUM CHLORIDE 25 ML: 9 INJECTION, SOLUTION INTRAVENOUS at 10:41

## 2023-06-28 RX ADMIN — LIDOCAINE HYDROCHLORIDE 100 MG: 20 INJECTION, SOLUTION EPIDURAL; INFILTRATION; INTRACAUDAL; PERINEURAL at 11:04

## 2023-06-28 RX ADMIN — PROPOFOL 50 MG: 10 INJECTION, EMULSION INTRAVENOUS at 11:08

## 2023-06-28 RX ADMIN — PROPOFOL 30 MG: 10 INJECTION, EMULSION INTRAVENOUS at 11:11

## 2023-06-28 ASSESSMENT — PAIN - FUNCTIONAL ASSESSMENT: PAIN_FUNCTIONAL_ASSESSMENT: 0-10

## 2024-11-07 ENCOUNTER — TELEPHONE (OUTPATIENT)
Age: 79
End: 2024-11-07

## 2025-08-18 ENCOUNTER — OFFICE VISIT (OUTPATIENT)
Age: 80
End: 2025-08-18
Payer: MEDICARE

## 2025-08-18 VITALS
TEMPERATURE: 97.8 F | HEART RATE: 73 BPM | BODY MASS INDEX: 28.98 KG/M2 | WEIGHT: 202.4 LBS | DIASTOLIC BLOOD PRESSURE: 68 MMHG | HEIGHT: 70 IN | OXYGEN SATURATION: 97 % | SYSTOLIC BLOOD PRESSURE: 128 MMHG

## 2025-08-18 DIAGNOSIS — Z86.79 STATUS POST ABLATION OF ATRIAL FIBRILLATION: ICD-10-CM

## 2025-08-18 DIAGNOSIS — G47.33 OSA (OBSTRUCTIVE SLEEP APNEA): Primary | ICD-10-CM

## 2025-08-18 DIAGNOSIS — Z98.890 STATUS POST ABLATION OF ATRIAL FIBRILLATION: ICD-10-CM

## 2025-08-18 PROCEDURE — 1123F ACP DISCUSS/DSCN MKR DOCD: CPT | Performed by: SPECIALIST

## 2025-08-18 PROCEDURE — G8419 CALC BMI OUT NRM PARAM NOF/U: HCPCS | Performed by: SPECIALIST

## 2025-08-18 PROCEDURE — 4004F PT TOBACCO SCREEN RCVD TLK: CPT | Performed by: SPECIALIST

## 2025-08-18 PROCEDURE — G8427 DOCREV CUR MEDS BY ELIG CLIN: HCPCS | Performed by: SPECIALIST

## 2025-08-18 PROCEDURE — 99203 OFFICE O/P NEW LOW 30 MIN: CPT | Performed by: SPECIALIST

## 2025-08-18 PROCEDURE — 1159F MED LIST DOCD IN RCRD: CPT | Performed by: SPECIALIST

## 2025-08-18 ASSESSMENT — SLEEP AND FATIGUE QUESTIONNAIRES
HOW LIKELY ARE YOU TO NOD OFF OR FALL ASLEEP IN A CAR, WHILE STOPPED FOR A FEW MINUTES IN TRAFFIC: WOULD NEVER DOZE
HOW LIKELY ARE YOU TO NOD OFF OR FALL ASLEEP WHILE SITTING AND READING: SLIGHT CHANCE OF DOZING
HOW LIKELY ARE YOU TO NOD OFF OR FALL ASLEEP WHEN YOU ARE A PASSENGER IN A CAR FOR AN HOUR WITHOUT A BREAK: SLIGHT CHANCE OF DOZING
ESS TOTAL SCORE: 4
HOW LIKELY ARE YOU TO NOD OFF OR FALL ASLEEP WHILE SITTING INACTIVE IN A PUBLIC PLACE: WOULD NEVER DOZE
HOW LIKELY ARE YOU TO NOD OFF OR FALL ASLEEP WHILE WATCHING TV: WOULD NEVER DOZE
HOW LIKELY ARE YOU TO NOD OFF OR FALL ASLEEP WHILE SITTING QUIETLY AFTER LUNCH WITHOUT ALCOHOL: WOULD NEVER DOZE
HOW LIKELY ARE YOU TO NOD OFF OR FALL ASLEEP WHILE LYING DOWN TO REST IN THE AFTERNOON WHEN CIRCUMSTANCES PERMIT: MODERATE CHANCE OF DOZING
HOW LIKELY ARE YOU TO NOD OFF OR FALL ASLEEP WHILE SITTING AND TALKING TO SOMEONE: WOULD NEVER DOZE

## 2025-08-19 ENCOUNTER — CLINICAL DOCUMENTATION (OUTPATIENT)
Age: 80
End: 2025-08-19

## (undated) DEVICE — SYSTEM REPROC CBL 3 LD DISPOSABLE

## (undated) DEVICE — CONTAINER SPEC 20 ML LID NEUT BUFF FORMALIN 10 % POLYPR STS

## (undated) DEVICE — IV START KIT: Brand: MEDLINE

## (undated) DEVICE — TOWEL 4 PLY TISS 19X30 SUE WHT

## (undated) DEVICE — BASIN EMSIS 16OZ GRAPHITE PLAS KID SHP MOLD GRAD FOR ORAL

## (undated) DEVICE — NEONATAL-ADULT SPO2 SENSOR: Brand: NELLCOR

## (undated) DEVICE — ELECTRODE,RADIOTRANSLUCENT,FOAM,5PK: Brand: MEDLINE

## (undated) DEVICE — NEEDLE HYPO 18GA L1.5IN PNK S STL HUB POLYPR SHLD REG BVL

## (undated) DEVICE — Device

## (undated) DEVICE — CATHETER IV 20GA L1.16IN OD1.0414-1.1176MM ID0.762-0.8382MM

## (undated) DEVICE — Z DISCONTINUED PER MEDLINE LINE GAS SAMPLING O2/CO2 LNG AD 13 FT NSL W/ TBNG FILTERLINE

## (undated) DEVICE — CUFF BLD PRSS AD CLTH SGL TB W/ BAYNT CONN ROUNDED CORNER

## (undated) DEVICE — TRAP SPEC COLL POLYP POLYSTYR --

## (undated) DEVICE — ENDOSCOPIC KIT COMPLIANCE ENDOKIT

## (undated) DEVICE — STRAINER URIN CALC RNL MSH -- CONVERT TO ITEM 357634

## (undated) DEVICE — SET ADMIN 16ML TBNG L100IN 2 Y INJ SITE IV PIGGY BK DISP

## (undated) DEVICE — SNARE ENDOSCP M L240CM W27MM SHTH DIA2.4MM CHN 2.8MM OVL

## (undated) DEVICE — SYR 3ML LL TIP 1/10ML GRAD --

## (undated) DEVICE — SYR 10ML LUER LOK 1/5ML GRAD --

## (undated) DEVICE — CATH IV AUTOGRD BC PNK 20GA 25 -- INSYTE

## (undated) DEVICE — 1200 GUARD II KIT W/5MM TUBE W/O VAC TUBE: Brand: GUARDIAN

## (undated) DEVICE — SOLIDIFIER MEDC 1200ML -- CONVERT TO 356117